# Patient Record
Sex: FEMALE | Race: BLACK OR AFRICAN AMERICAN | Employment: FULL TIME | URBAN - METROPOLITAN AREA
[De-identification: names, ages, dates, MRNs, and addresses within clinical notes are randomized per-mention and may not be internally consistent; named-entity substitution may affect disease eponyms.]

---

## 2021-06-11 ENCOUNTER — OFFICE VISIT (OUTPATIENT)
Dept: FAMILY MEDICINE CLINIC | Age: 51
End: 2021-06-11
Payer: COMMERCIAL

## 2021-06-11 ENCOUNTER — HOSPITAL ENCOUNTER (OUTPATIENT)
Dept: GENERAL RADIOLOGY | Age: 51
Discharge: HOME | End: 2021-06-11
Payer: COMMERCIAL

## 2021-06-11 VITALS
SYSTOLIC BLOOD PRESSURE: 136 MMHG | HEIGHT: 63 IN | RESPIRATION RATE: 20 BRPM | DIASTOLIC BLOOD PRESSURE: 69 MMHG | OXYGEN SATURATION: 98 % | HEART RATE: 87 BPM | TEMPERATURE: 97 F | BODY MASS INDEX: 35.08 KG/M2 | WEIGHT: 198 LBS

## 2021-06-11 DIAGNOSIS — Z13.0 SCREENING FOR DEFICIENCY ANEMIA: ICD-10-CM

## 2021-06-11 DIAGNOSIS — Z13.29 SCREENING FOR THYROID DISORDER: ICD-10-CM

## 2021-06-11 DIAGNOSIS — R35.0 FREQUENCY OF URINATION: ICD-10-CM

## 2021-06-11 DIAGNOSIS — Z00.00 ENCOUNTER FOR MEDICAL EXAMINATION TO ESTABLISH CARE: Primary | ICD-10-CM

## 2021-06-11 DIAGNOSIS — Z11.1 SCREENING-PULMONARY TB: ICD-10-CM

## 2021-06-11 DIAGNOSIS — E11.9 CONTROLLED TYPE 2 DIABETES MELLITUS WITHOUT COMPLICATION, WITHOUT LONG-TERM CURRENT USE OF INSULIN (HCC): ICD-10-CM

## 2021-06-11 DIAGNOSIS — Z11.59 NEED FOR HEPATITIS C SCREENING TEST: ICD-10-CM

## 2021-06-11 DIAGNOSIS — E78.00 HYPERCHOLESTEROLEMIA: ICD-10-CM

## 2021-06-11 DIAGNOSIS — E56.9 VITAMIN DEFICIENCY: ICD-10-CM

## 2021-06-11 PROBLEM — I10 HYPERTENSION: Status: ACTIVE | Noted: 2021-06-11

## 2021-06-11 PROCEDURE — 71046 X-RAY EXAM CHEST 2 VIEWS: CPT

## 2021-06-11 PROCEDURE — 99386 PREV VISIT NEW AGE 40-64: CPT | Performed by: INTERNAL MEDICINE

## 2021-06-11 RX ORDER — GABAPENTIN 100 MG/1
100 CAPSULE ORAL
COMMUNITY
Start: 2020-07-26 | End: 2021-06-11 | Stop reason: ALTCHOICE

## 2021-06-11 RX ORDER — EZETIMIBE 10 MG/1
10 TABLET ORAL DAILY
COMMUNITY
End: 2021-07-01 | Stop reason: ALTCHOICE

## 2021-06-11 RX ORDER — METFORMIN HYDROCHLORIDE 1000 MG/1
1000 TABLET ORAL 2 TIMES DAILY WITH MEALS
Qty: 60 TABLET | Refills: 3 | Status: SHIPPED | OUTPATIENT
Start: 2021-06-11 | End: 2021-07-01 | Stop reason: ALTCHOICE

## 2021-06-11 RX ORDER — ATORVASTATIN CALCIUM 80 MG/1
80 TABLET, FILM COATED ORAL DAILY
COMMUNITY
End: 2021-07-01 | Stop reason: ALTCHOICE

## 2021-06-11 RX ORDER — METFORMIN HYDROCHLORIDE 1000 MG/1
1000 TABLET ORAL 2 TIMES DAILY WITH MEALS
COMMUNITY
End: 2021-06-11 | Stop reason: SDUPTHER

## 2021-06-11 NOTE — PROGRESS NOTES
Chief Complaint   Patient presents with    New Patient     Patient want to discuss a different diabetic medication.

## 2021-06-11 NOTE — LETTER
6/11/2021    Ms. Brittany Lin  [address] [facility] [id number]      Dear Brittany Gonzalezving:    Please find your most recent results below. Chest x-ray is negative infection   Resulted Orders   XR CHEST PA LAT    Narrative    Exam:  2 view chest    Indication: Screening for pulmonary TB.    COMPARISON: 11/14/2013    PA and lateral views demonstrate normal heart size. There is no acute process in  the lung fields. No adenopathy. No pleural effusions. The osseous structures are  unremarkable. Impression    1. Normal chest radiograph       RECOMMENDATIONS:  None. Keep up the good work!     Please call me if you have any questions: [phone]    Sincerely,      Salima Teresa MD

## 2021-06-11 NOTE — PATIENT INSTRUCTIONS

## 2021-06-11 NOTE — PROGRESS NOTES
Chief Complaint   Patient presents with    New Patient     HPI:  Gabriela Mckeon is a 54 y.o. AA female with h/o hypertension, hypercholesterolemia, diabetes type 2 presents to establish care for physical exam  Patient is doing well. Blood pressure is at goal. She is here for clearance for foster parenting.  She has no complaint     Review of Systems  Constitutional: negative for fevers/chills  Eyes:   negative for visual disturbance   Respiratory:  negative for cough, dyspnea,wheezing  CV:   negative for chest pain, palpitations, lower extremity edema  GI:   negative for nausea, abdominal pain  Endo:             negative for polyuria,polydipsia,polyphagia,heat intolerance  Genitourinary: negative for frequency, dysuria   Integument:  negative for rash and pruritus  Musculoskel: negative for myalgias, arthralgias, back pain  Neurological:  negative for headaches, dizziness  Behavl/Psych: negative for feelings of anxiety, depression, mood changes    Past Medical History:   Diagnosis Date    Diabetes ([facility] [address]     Hypercholesterolemia     Hypertension      Past Surgical History:   Procedure Laterality Date    HX GYN      D&C    HX OTHER SURGICAL      tonsils     Social History     Socioeconomic History    Marital status:      Spouse name: Not on file    Number of children: Not on file    Years of education: Not on file    Highest education level: Not on file   Tobacco Use    Smoking status: Never Smoker    Smokeless tobacco: Never Used   Substance and Sexual Activity    Alcohol use: Yes     Comment: social    Drug use: Not Currently    Sexual activity: Yes     Partners: Male     Birth control/protection: Surgical     Social Determinants of Health     Financial Resource Strain:     Difficulty of Paying Living Expenses:    Food Insecurity:     Worried About Running Out of Food in the Last Year:     Ran Out of Food in the Last Year:    Transportation Needs:     Lack of Transportation (Medical):  Lack of Transportation (Non-Medical):    Physical Activity:     Days of Exercise per Week:     Minutes of Exercise per Session:    Stress:     Feeling of Stress :    Social Connections:     Frequency of Communication with Friends and Family:     Frequency of Social Gatherings with Friends and Family:     Attends Yazdanism Services:     Active Member of Clubs or Organizations:     Attends Club or Organization Meetings:     Marital Status:      Family History   Problem Relation Age of Onset    Diabetes Mother     Cancer Mother     Cancer Father      Current Outpatient Medications   Medication Sig Dispense Refill    semaglutide (Rybelsus) 14 mg tablet Take 14 mg by mouth Daily (before breakfast).  metFORMIN (GLUCOPHAGE) 1,000 mg tablet Take 1,000 mg by mouth two (2) times daily (with meals).  atorvastatin (LIPITOR) 80 mg tablet Take 80 mg by mouth daily.  ezetimibe (ZETIA) 10 mg tablet Take 10 mg by mouth daily.        Allergies   Allergen Reactions    Flagyl [Metronidazole] Hives    Penicillin G Hives     Objective:  Visit Vitals  /69   Pulse 87   Temp 97 °F (36.1 °C) (Oral)   Resp 20   Ht 5' 3\" (1.6 m)   Wt 198 lb (89.8 kg)   SpO2 98%   BMI 35.07 kg/m²     Physical Exam:   General appearance - alert, well appearing in no distress  Mental status - alert, oriented to person, place, and time  EYE-PERRL, EOMI  ENT-ENT exam normal, palpable neck nodes, maxillary sinus tenderness  Neck - supple, no significant adenopathy   Chest - clear to auscultation, no wheezes, rales or rhonchi  Heart - normal rate, regular rhythm, no murmurs  Abdomen - soft, nontender, nondistended, no masses or organomegaly  Lymph- no adenopathy palpable  Ext-peripheral pulses normal, no pedal edema  Neuro -no focal findings   Back-full range of motion, no tenderness, palpable spasm or pain on motion     Assessment/Plan:  Diagnoses and all orders for this visit:    Encounter for medical examination to establish care  -     METABOLIC PANEL, COMPREHENSIVE; Future  -     CBC W/O DIFF; Future  -     METABOLIC PANEL, COMPREHENSIVE  -     CBC W/O DIFF    Screening-pulmonary TB  -     XR CHEST PA LAT; Future    Need for hepatitis C screening test  -     HEPATITIS C AB; Future  -     HEPATITIS C AB    Hypercholesterolemia  -     LIPID PANEL; Future  -     LIPID PANEL    Controlled type 2 diabetes mellitus without complication, without long-term current use of insulin (HCC)  -     METABOLIC PANEL, COMPREHENSIVE; Future  -     CBC W/O DIFF; Future  -     HEMOGLOBIN A1C WITH EAG; Future  -     MICROALBUMIN, UR, RAND W/ MICROALB/CREAT RATIO; Future  -     semaglutide (Rybelsus) 14 mg tablet; Take 1 Tablet by mouth Daily (before breakfast). Take 14 mg by mouth Daily (before breakfast). , Normal, Disp-30 Tablet, R-3  -     metFORMIN (GLUCOPHAGE) 1,000 mg tablet; Take 1 Tablet by mouth two (2) times daily (with meals). , Normal, Disp-60 Tablet, R-3  -     METABOLIC PANEL, COMPREHENSIVE  -     CBC W/O DIFF  -     HEMOGLOBIN A1C WITH EAG  -     MICROALBUMIN, UR, RAND W/ MICROALB/CREAT RATIO    Vitamin deficiency  -     VITAMIN D, 25 HYDROXY; Future  -     VITAMIN D, 25 HYDROXY    Frequency of urination  -     URINALYSIS W/ RFLX MICROSCOPIC; Future  -     URINALYSIS W/ RFLX MICROSCOPIC    Screening for thyroid disorder  -     TSH 3RD GENERATION; Future  -     TSH 3RD GENERATION    Screening for deficiency anemia  -     CBC W/O DIFF; Future  -     CBC W/O DIFF      Patient Instructions       Nutrition Tips for Diabetes: After Your Visit  Your Care Instructions  A healthy diet is important to manage diabetes. It helps you lose weight (if you need to) and keep it off. It gives you the nutrition and energy your body needs and helps prevent heart disease. But a diet for diabetes does not mean that you have to eat special foods. You can eat what your family eats, including occasional sweets and other favorites.  But you do have to pay attention to how often you eat and how much you eat of certain foods. The right plan for you will give you meals that help you keep your blood sugar at healthy levels. Try to eat a variety of foods and to spread carbohydrate throughout the day. Carbohydrate raises blood sugar higher and more quickly than any other nutrient does. Carbohydrate is found in sugar, breads and cereals, fruit, starchy vegetables such as potatoes and corn, and milk and yogurt. You may want to work with a dietitian or diabetes educator to help you plan meals and snacks. A dietitian or diabetes educator also can help you lose weight if that is one of your goals. The following tips can help you enjoy your meals and stay healthy. Follow-up care is a key part of your treatment and safety. Be sure to make and go to all appointments, and call your doctor if you are having problems. Its also a good idea to know your test results and keep a list of the medicines you take. How can you care for yourself at home? · Learn which foods have carbohydrate and how much carbohydrate to eat. A dietitian or diabetes educator can help you learn to keep track of how much carbohydrate you eat. · Spread carbohydrate throughout the day. Eat some carbohydrate at all meals, but do not eat too much at any one time. · Plan meals to include food from all the food groups. These are the food groups and some example portion sizes:  ¨ Grains: 1 slice of bread (1 ounce), ½ cup of cooked cereal, and 1/3 cup of cooked pasta or rice. These have about 15 grams of carbohydrate in a serving. Choose whole grains such as whole wheat bread or crackers, oatmeal, and brown rice more often than refined grains. ¨ Fruit: 1 small fresh fruit, such as an apple or orange; ½ of a banana; ½ cup of chopped, cooked, or canned fruit; ½ cup of fruit juice; 1 cup of melon or raspberries; and 2 tablespoons of dried fruit. These have about 15 grams of carbohydrate in a serving.   ¨ Dairy: 1 cup of nonfat or low-fat milk and 2/3 cup of plain yogurt. These have about 15 grams of carbohydrate in a serving. ¨ Protein foods: Beef, chicken, turkey, fish, eggs, tofu, cheese, cottage cheese, and peanut butter. A serving size of meat is 3 ounces, which is about the size of a deck of cards. Examples of meat substitute serving sizes (equal to 1 ounce of meat) are 1/4 cup of cottage cheese, 1 egg, 1 tablespoon of peanut butter, and ½ cup of tofu. These have very little or no carbohydrate per serving. ¨ Vegetables: Starchy vegetables such as ½ cup of cooked dried beans, peas, potatoes, or corn have about 15 grams of carbohydrate. Nonstarchy vegetables have very little carbohydrate, such as 1 cup of raw leafy vegetables (such as spinach), ½ cup of other vegetables (cooked or chopped), and 3/4 cup of vegetable juice. · Use the plate format to plan meals. It is a good, quick way to make sure that you have a balanced meal. It also helps you spread carbohydrate throughout the day. You divide your plate by types of foods. Put vegetables on half the plate, meat or meat substitutes on one-quarter of the plate, and a grain or starchy vegetable (such as brown rice or a potato) in the final quarter of the plate. To this you can add a small piece of fruit and 1 cup of milk or yogurt, depending on how much carbohydrate you are supposed to eat at a meal.  · Talk to your dietitian or diabetes educator about ways to add limited amounts of sweets into your meal plan. You can eat these foods now and then, as long as you include the amount of carbohydrate they have in your daily carbohydrate allowance. · If you drink alcohol, limit it to no more than 1 drink a day for women and 2 drinks a day for men. If you are pregnant, no amount of alcohol is known to be safe. · Protein, fat, and fiber do not raise blood sugar as much as carbohydrate does.  If you eat a lot of these nutrients in a meal, your blood sugar will rise more slowly than it would otherwise. · Limit saturated fats, such as those from meat and dairy products. Try to replace it with monounsaturated fat, such as olive oil. This is a healthier choice because people who have diabetes are at higher-than-average risk of heart disease. But use a modest amount of olive oil. A tablespoon of olive oil has 14 grams of fat and 120 calories. · Exercise lowers blood sugar. If you take insulin by shots or pump, you can use less than you would if you were not exercising. Keep in mind that timing matters. If you exercise within 1 hour after a meal, your body may need less insulin for that meal than it would if you exercised 3 hours after the meal. Test your blood sugar to find out how exercise affects your need for insulin. · Exercise on most days of the week. Aim for at least 30 minutes. Exercise helps you stay at a healthy weight and helps your body use insulin. Walking is an easy way to get exercise. Gradually increase the amount you walk every day. You also may want to swim, bike, or do other activities. When you eat out  · Learn to estimate the serving sizes of foods that have carbohydrate. If you measure food at home, it will be easier to estimate the amount in a serving of restaurant food. · If the meal you order has too much carbohydrate (such as potatoes, corn, or baked beans), ask to have a low-carbohydrate food instead. Ask for a salad or green vegetables. · If you use insulin, check your blood sugar before and after eating out to help you plan how much to eat in the future. · If you eat more carbohydrate at a meal than you had planned, take a walk or do other exercise. This will help lower your blood sugar. Where can you learn more? Go to [employer]  Enter B428 in the search box to learn more about \"Nutrition Tips for Diabetes: After Your Visit. \"   © 7523-7602 [employer].  Care instructions adapted under license by [employer] (which disclaims liability or warranty for this information). This care instruction is for use with your licensed healthcare professional. If you have questions about a medical condition or this instruction, always ask your healthcare professional. [address] any warranty or liability for your use of this information. Content Version: 51.6.334959; Current as of: June 4, 2014                Follow-up and Dispositions    · Return 2-3 weeks, for f/u results.

## 2021-06-12 LAB
25(OH)D3+25(OH)D2 SERPL-MCNC: 16.2 NG/ML (ref 30–100)
ALBUMIN SERPL-MCNC: 4.5 G/DL (ref 3.8–4.8)
ALBUMIN/CREAT UR: <2 MG/G CREAT (ref 0–29)
ALBUMIN/GLOB SERPL: 1.8 {RATIO} (ref 1.2–2.2)
ALP SERPL-CCNC: 75 IU/L (ref 48–121)
ALT SERPL-CCNC: 9 IU/L (ref 0–32)
APPEARANCE UR: ABNORMAL
AST SERPL-CCNC: 14 IU/L (ref 0–40)
BILIRUB SERPL-MCNC: 0.3 MG/DL (ref 0–1.2)
BILIRUB UR QL STRIP: NEGATIVE
BUN SERPL-MCNC: 11 MG/DL (ref 6–24)
BUN/CREAT SERPL: 14 (ref 9–23)
CALCIUM SERPL-MCNC: 9.3 MG/DL (ref 8.7–10.2)
CHLORIDE SERPL-SCNC: 102 MMOL/L (ref 96–106)
CHOLEST SERPL-MCNC: 182 MG/DL (ref 100–199)
CO2 SERPL-SCNC: 22 MMOL/L (ref 20–29)
COLOR UR: YELLOW
CREAT SERPL-MCNC: 0.76 MG/DL (ref 0.57–1)
CREAT UR-MCNC: 145 MG/DL
ERYTHROCYTE [DISTWIDTH] IN BLOOD BY AUTOMATED COUNT: 12.8 % (ref 11.7–15.4)
EST. AVERAGE GLUCOSE BLD GHB EST-MCNC: 151 MG/DL
GLOBULIN SER CALC-MCNC: 2.5 G/DL (ref 1.5–4.5)
GLUCOSE SERPL-MCNC: 116 MG/DL (ref 65–99)
GLUCOSE UR QL: NEGATIVE
HBA1C MFR BLD: 6.9 % (ref 4.8–5.6)
HCT VFR BLD AUTO: 34.2 % (ref 34–46.6)
HCV AB S/CO SERPL IA: <0.1 S/CO RATIO (ref 0–0.9)
HDLC SERPL-MCNC: 96 MG/DL
HGB BLD-MCNC: 11.1 G/DL (ref 11.1–15.9)
HGB UR QL STRIP: NEGATIVE
KETONES UR QL STRIP: NEGATIVE
LDLC SERPL CALC-MCNC: 72 MG/DL (ref 0–99)
LEUKOCYTE ESTERASE UR QL STRIP: NEGATIVE
MCH RBC QN AUTO: 27.8 PG (ref 26.6–33)
MCHC RBC AUTO-ENTMCNC: 32.5 G/DL (ref 31.5–35.7)
MCV RBC AUTO: 86 FL (ref 79–97)
MICRO URNS: ABNORMAL
MICROALBUMIN UR-MCNC: <3 UG/ML
NITRITE UR QL STRIP: NEGATIVE
PH UR STRIP: 7.5 [PH] (ref 5–7.5)
PLATELET # BLD AUTO: 253 X10E3/UL (ref 150–450)
POTASSIUM SERPL-SCNC: 4 MMOL/L (ref 3.5–5.2)
PROT SERPL-MCNC: 7 G/DL (ref 6–8.5)
PROT UR QL STRIP: NEGATIVE
RBC # BLD AUTO: 3.99 X10E6/UL (ref 3.77–5.28)
SODIUM SERPL-SCNC: 138 MMOL/L (ref 134–144)
SP GR UR: 1.02 (ref 1–1.03)
TRIGL SERPL-MCNC: 74 MG/DL (ref 0–149)
TSH SERPL DL<=0.005 MIU/L-ACNC: 1.05 UIU/ML (ref 0.45–4.5)
UROBILINOGEN UR STRIP-MCNC: 0.2 MG/DL (ref 0.2–1)
VLDLC SERPL CALC-MCNC: 14 MG/DL (ref 5–40)
WBC # BLD AUTO: 5.8 X10E3/UL (ref 3.4–10.8)

## 2021-07-01 ENCOUNTER — OFFICE VISIT (OUTPATIENT)
Dept: FAMILY MEDICINE CLINIC | Age: 51
End: 2021-07-01
Payer: COMMERCIAL

## 2021-07-01 VITALS
WEIGHT: 203.2 LBS | HEART RATE: 78 BPM | SYSTOLIC BLOOD PRESSURE: 137 MMHG | BODY MASS INDEX: 36 KG/M2 | DIASTOLIC BLOOD PRESSURE: 79 MMHG | TEMPERATURE: 97.3 F | OXYGEN SATURATION: 93 % | RESPIRATION RATE: 16 BRPM | HEIGHT: 63 IN

## 2021-07-01 DIAGNOSIS — E56.9 VITAMIN DEFICIENCY: ICD-10-CM

## 2021-07-01 DIAGNOSIS — E78.00 HYPERCHOLESTEROLEMIA: ICD-10-CM

## 2021-07-01 DIAGNOSIS — E11.9 ENCOUNTER FOR DIABETIC FOOT EXAM (HCC): ICD-10-CM

## 2021-07-01 DIAGNOSIS — E11.9 DIABETIC EYE EXAM (HCC): ICD-10-CM

## 2021-07-01 DIAGNOSIS — E11.9 CONTROLLED TYPE 2 DIABETES MELLITUS WITHOUT COMPLICATION, WITHOUT LONG-TERM CURRENT USE OF INSULIN (HCC): Primary | ICD-10-CM

## 2021-07-01 DIAGNOSIS — Z01.419 ENCOUNTER FOR GYNECOLOGICAL EXAMINATION: ICD-10-CM

## 2021-07-01 DIAGNOSIS — Z01.00 DIABETIC EYE EXAM (HCC): ICD-10-CM

## 2021-07-01 PROCEDURE — 99214 OFFICE O/P EST MOD 30 MIN: CPT | Performed by: INTERNAL MEDICINE

## 2021-07-01 RX ORDER — ACETAMINOPHEN 500 MG
2000 TABLET ORAL DAILY
Qty: 30 CAPSULE | Refills: 3 | Status: SHIPPED | OUTPATIENT
Start: 2021-07-01 | End: 2021-09-24 | Stop reason: ALTCHOICE

## 2021-07-01 RX ORDER — LISINOPRIL 2.5 MG/1
2.5 TABLET ORAL DAILY
Qty: 30 TABLET | Refills: 2 | Status: SHIPPED | OUTPATIENT
Start: 2021-07-01 | End: 2021-10-04

## 2021-07-01 RX ORDER — METFORMIN HYDROCHLORIDE 500 MG/1
1000 TABLET, EXTENDED RELEASE ORAL
Qty: 60 TABLET | Refills: 3 | Status: SHIPPED | OUTPATIENT
Start: 2021-07-01 | End: 2021-08-27 | Stop reason: SDUPTHER

## 2021-07-01 RX ORDER — ROSUVASTATIN CALCIUM 5 MG/1
5 TABLET, COATED ORAL
Qty: 30 TABLET | Refills: 3 | Status: SHIPPED | OUTPATIENT
Start: 2021-07-01 | End: 2021-10-04

## 2021-07-01 NOTE — PATIENT INSTRUCTIONS
Learning About Foods With Healthy Fats  What foods contain healthy fats? The foods you eat contain nutrients, such as vitamins and minerals. Fat is a nutrient. Your body needs the right amount to stay healthy and work as it should. You can use the list below to help you make choices about which foods to eat. Here are some foods that contain healthy fats. Unsaturated fats and oils  · Canola oil  · Corn oil  · Flaxseed oil  · Olive oil  · Peanut oil  · Safflower oil  · Sunflower oil  Seafood  · Herring  · Lake trout  · Mackerel  · Oysters  · [place]  · Sardines  Nuts and seeds  · Almonds  · Catalino seeds  · Flaxseeds (ground)  · Hazelnuts  · Nut butters (such as peanut and almond)  · Pumpkin seeds  · Sunflower seeds  · Walnuts  Fruits  · Avocados  · Olives  Work with your doctor to find out how much of this nutrient you need. Depending on your health, you may need more or less of it in your diet. Where can you learn more? Go to http://www.ace.com/  Enter F360 in the search box to learn more about \"Learning About Foods With Healthy Fats. \"  Current as of: December 17, 2020               Content Version: 12.8  © 2006-2021 Healthwise, Incorporated. Care instructions adapted under license by [employer] (which disclaims liability or warranty for this information). If you have questions about a medical condition or this instruction, always ask your healthcare professional. [place] [address] any warranty or liability for your use of this information.

## 2021-07-01 NOTE — PROGRESS NOTES
Chief Complaint   Patient presents with    Results     labs review      HPI:  Atilio Posey is a 54 y.o. AA female presents for 3 week follow up on lab results. Serum Vit D is below normal. Urine is cloudy without sign of infection. Otherwise, the rest of results are within normal limits. Results and management have discussed. Diet and exercise are encouraged. Current medication dosage have adjusted. Review of Systems  As per hpi    Past Medical History:   Diagnosis Date    Diabetes ([address]     Hypercholesterolemia     Hypertension      Past Surgical History:   Procedure Laterality Date    HX AFIB ABLATION  2012    HX GYN      D&C    HX ORTHOPAEDIC      bicipital tendon repair 2016    HX OTHER SURGICAL      tonsils    HX TONSILLECTOMY      age 27     Social History     Socioeconomic History    Marital status:      Spouse name: Not on file    Number of children: Not on file    Years of education: Not on file    Highest education level: Not on file   Tobacco Use    Smoking status: Never Smoker    Smokeless tobacco: Never Used   Vaping Use    Vaping Use: Never used   Substance and Sexual Activity    Alcohol use: Yes     Comment: social    Drug use: Not Currently    Sexual activity: Yes     Partners: Male     Birth control/protection: Surgical     Social Determinants of Health     Financial Resource Strain:     Difficulty of Paying Living Expenses:    Food Insecurity:     Worried About Running Out of Food in the Last Year:     Ran Out of Food in the Last Year:    Transportation Needs:     Lack of Transportation (Medical):      Lack of Transportation (Non-Medical):    Physical Activity:     Days of Exercise per Week:     Minutes of Exercise per Session:    Stress:     Feeling of Stress :    Social Connections:     Frequency of Communication with Friends and Family:     Frequency of Social Gatherings with Friends and Family:     Attends Gnosticist Services:     Active Member of Clubs or Organizations:     Attends Club or Organization Meetings:     Marital Status:      Family History   Problem Relation Age of Onset    Diabetes Mother     Cancer Mother     Cancer Father      Current Outpatient Medications   Medication Sig Dispense Refill    semaglutide (Rybelsus) 14 mg tablet Take 1 Tablet by mouth Daily (before breakfast). Take 14 mg by mouth Daily (before breakfast). 30 Tablet 3     Allergies   Allergen Reactions    Flagyl [Metronidazole] Hives    Penicillin G Hives    Tuberculin Ppd Other (comments)     Contraindication  ( positive test )     Objective:  Visit Vitals  /79   Pulse 78   Temp 97.3 °F (36.3 °C) (Oral)   Resp 16   Ht 5' 3\" (1.6 m)   Wt 203 lb 3.2 oz (92.2 kg)   LMP 06/29/2021 (Exact Date)   SpO2 93%   BMI 36.00 kg/m²     Physical Exam:   General appearance - alert, well appearing in no distress  Mental status - alert, oriented to person, place, and time  Neck - supple, no significant adenopathy   Chest - clear to auscultation, no wheezes, rales or rhonchi  Heart - normal rate, regular rhythm, no murmurs  Abdomen - soft, nontender, nondistended, no organomegaly  Ext-peripheral pulses normal, no pedal edema    Results for orders placed or performed in visit on 06/11/21   HEPATITIS C AB   Result Value Ref Range    Hep C Virus Ab <0.1 0.0 - 0.9 s/co ratio   METABOLIC PANEL, COMPREHENSIVE   Result Value Ref Range    Glucose 116 (H) 65 - 99 mg/dL    BUN 11 6 - 24 mg/dL    Creatinine 0.76 0.57 - 1.00 mg/dL    GFR est non-AA 92 >59 mL/min/1.73    GFR est  >59 mL/min/1.73    BUN/Creatinine ratio 14 9 - 23    Sodium 138 134 - 144 mmol/L    Potassium 4.0 3.5 - 5.2 mmol/L    Chloride 102 96 - 106 mmol/L    CO2 22 20 - 29 mmol/L    Calcium 9.3 8.7 - 10.2 mg/dL    Protein, total 7.0 6.0 - 8.5 g/dL    Albumin 4.5 3.8 - 4.8 g/dL    GLOBULIN, TOTAL 2.5 1.5 - 4.5 g/dL    A-G Ratio 1.8 1.2 - 2.2    Bilirubin, total 0.3 0.0 - 1.2 mg/dL    Alk.  phosphatase 75 48 - 121 IU/L AST (SGOT) 14 0 - 40 IU/L    ALT (SGPT) 9 0 - 32 IU/L   CBC W/O DIFF   Result Value Ref Range    WBC 5.8 3.4 - 10.8 x10E3/uL    RBC 3.99 3.77 - 5.28 x10E6/uL    HGB 11.1 11.1 - 15.9 g/dL    HCT 34.2 34.0 - 46.6 %    MCV 86 79 - 97 fL    MCH 27.8 26.6 - 33.0 pg    MCHC 32.5 31.5 - 35.7 g/dL    RDW 12.8 11.7 - 15.4 %    PLATELET 497 957 - 833 x10E3/uL   LIPID PANEL   Result Value Ref Range    Cholesterol, total 182 100 - 199 mg/dL    Triglyceride 74 0 - 149 mg/dL    HDL Cholesterol 96 >39 mg/dL    VLDL, calculated 14 5 - 40 mg/dL    LDL, calculated 72 0 - 99 mg/dL   HEMOGLOBIN A1C WITH EAG   Result Value Ref Range    Hemoglobin A1c 6.9 (H) 4.8 - 5.6 %    Estimated average glucose 151 mg/dL   VITAMIN D, 25 HYDROXY   Result Value Ref Range    VITAMIN D, 25-HYDROXY 16.2 (L) 30.0 - 100.0 ng/mL   URINALYSIS W/ RFLX MICROSCOPIC   Result Value Ref Range    Specific Gravity 1.024 1.005 - 1.030    pH (UA) 7.5 5.0 - 7.5    Color Yellow Yellow    Appearance Cloudy (A) Clear    Leukocyte Esterase Negative Negative    Protein Negative Negative/Trace    Glucose Negative Negative    Ketone Negative Negative    Blood Negative Negative    Bilirubin Negative Negative    Urobilinogen 0.2 0.2 - 1.0 mg/dL    Nitrites Negative Negative    Microscopic Examination Comment    MICROALBUMIN, UR, RAND W/ MICROALB/CREAT RATIO   Result Value Ref Range    Creatinine, urine 145.0 Not Estab. mg/dL    Microalbumin, urine <3.0 Not Estab. ug/mL    Microalb/Creat ratio (ug/mg creat.) <2 0 - 29 mg/g creat   TSH 3RD GENERATION   Result Value Ref Range    TSH 1.050 0.450 - 4.500 uIU/mL     Assessment/Plan:  Diagnoses and all orders for this visit:    Controlled type 2 diabetes mellitus without complication, without long-term current use of insulin (HCC)  -     rosuvastatin (CRESTOR) 5 mg tablet; Take 1 Tablet by mouth nightly., Normal, Disp-30 Tablet, R-3  -     metFORMIN ER (GLUCOPHAGE XR) 500 mg tablet; Take 2 Tablets by mouth daily (with dinner). , Normal, Disp-60 Tablet, R-3  -     lisinopriL (PRINIVIL, ZESTRIL) 2.5 mg tablet; Take 1 Tablet by mouth daily. , Normal, Disp-30 Tablet, R-2    Hypercholesterolemia  -     rosuvastatin (CRESTOR) 5 mg tablet; Take 1 Tablet by mouth nightly., Normal, Disp-30 Tablet, R-3    Vitamin deficiency  -     cholecalciferol (VITAMIN D3) (2,000 UNITS /50 MCG) cap capsule; Take 1 Capsule by mouth daily. , Normal, Disp-30 Capsule, R-3    Diabetic eye exam ([facility] [address]  -     REFERRAL TO OPHTHALMOLOGY    Encounter for diabetic foot exam ([facility] [address]  -     REFERRAL TO PODIATRY    Encounter for gynecological examination      Patient Instructions        Learning About Foods With Healthy Fats  What foods contain healthy fats? The foods you eat contain nutrients, such as vitamins and minerals. Fat is a nutrient. Your body needs the right amount to stay healthy and work as it should. You can use the list below to help you make choices about which foods to eat. Here are some foods that contain healthy fats. Unsaturated fats and oils  · Canola oil  · Corn oil  · Flaxseed oil  · Olive oil  · Peanut oil  · Safflower oil  · Sunflower oil  Seafood  · [place]ring  · [place]  · Mackerel  · Oysters  · [place]  · Sardines  Nuts and seeds  · Almonds  · Catalino seeds  · Flaxseeds (ground)  · Hazelnuts  · Nut butters (such as peanut and almond)  · Pumpkin seeds  · Sunflower seeds  · Walnuts  Fruits  · Avocados  · Olives  Work with your doctor to find out how much of this nutrient you need. Depending on your health, you may need more or less of it in your diet. Where can you learn more? Go to http://www.[employer].com/  Enter F360 in the search box to learn more about \"Learning About Foods With Healthy Fats. \"  Current as of: December 17, 2020               Content Version: 12.8  © 5580-2879 Healthwise, Incorporated. Care instructions adapted under license by [employer] (which disclaims liability or warranty for this information). If you have questions about a medical condition or this instruction, always ask your healthcare professional. [place] [address] any warranty or liability for your use of this information. Follow-up and Dispositions    · Return in about 4 months (around 11/1/2021), or if symptoms worsen or fail to improve, for routine follow up.

## 2021-07-01 NOTE — LETTER
6/25/2021    Ms. Atilio Posey  [address] [place] [id number]      Dear Atilio Posey:    Please find your most recent results below. Resulted Orders   HEPATITIS C AB   Result Value Ref Range    Hep C Virus Ab <0.1 0.0 - 0.9 s/co ratio    Narrative    Performed at:  [address]  [id number]  : Nallely Quintero MD, Phone:  [phone]   METABOLIC PANEL, COMPREHENSIVE   Result Value Ref Range    Glucose 116 (H) 65 - 99 mg/dL    BUN 11 6 - 24 mg/dL    Creatinine 0.76 0.57 - 1.00 mg/dL    GFR est non-AA 92 >59 mL/min/1.73    GFR est  >59 mL/min/1.73    BUN/Creatinine ratio 14 9 - 23    Sodium 138 134 - 144 mmol/L    Potassium 4.0 3.5 - 5.2 mmol/L    Chloride 102 96 - 106 mmol/L    CO2 22 20 - 29 mmol/L    Calcium 9.3 8.7 - 10.2 mg/dL    Protein, total 7.0 6.0 - 8.5 g/dL    Albumin 4.5 3.8 - 4.8 g/dL    GLOBULIN, TOTAL 2.5 1.5 - 4.5 g/dL    A-G Ratio 1.8 1.2 - 2.2    Bilirubin, total 0.3 0.0 - 1.2 mg/dL    Alk.  phosphatase 75 48 - 121 IU/L    AST (SGOT) 14 0 - 40 IU/L    ALT (SGPT) 9 0 - 32 IU/L    Narrative    Performed at:  [address]  [id number]  : Nallely Quintero MD, Phone:  [phone]   CBC W/O DIFF   Result Value Ref Range    WBC 5.8 3.4 - 10.8 x10E3/uL    RBC 3.99 3.77 - 5.28 x10E6/uL    HGB 11.1 11.1 - 15.9 g/dL    HCT 34.2 34.0 - 46.6 %    MCV 86 79 - 97 fL    MCH 27.8 26.6 - 33.0 pg    MCHC 32.5 31.5 - 35.7 g/dL    RDW 12.8 11.7 - 15.4 %    PLATELET 376 819 - 265 x10E3/uL    Narrative    Performed at:  [address]  [id number]  : Nallely Quintero MD, Phone:  [phone]   LIPID PANEL   Result Value Ref Range    Cholesterol, total 182 100 - 199 mg/dL    Triglyceride 74 0 - 149 mg/dL    HDL Cholesterol 96 >39 mg/dL    VLDL, calculated 14 5 - 40 mg/dL    LDL, calculated 72 0 - 99 mg/dL    Narrative    Performed at:  [address]  [id number]  : Radha Brown MD, Phone:  [phone]   HEMOGLOBIN A1C WITH EAG   Result Value Ref Range    Hemoglobin A1c 6.9 (H) 4.8 - 5.6 %    Estimated average glucose 151 mg/dL    Narrative    Performed at:  [address]  [id number]  : Radha Brown MD, Phone:  [phone]   VITAMIN D, 25 HYDROXY   Result Value Ref Range    VITAMIN D, 25-HYDROXY 16.2 (L) 30.0 - 100.0 ng/mL    Narrative    Performed at:  [address]  [id number]  : Radha Brown MD, Phone:  [phone]   URINALYSIS W/ RFLX MICROSCOPIC   Result Value Ref Range    Specific Gravity 1.024 1.005 - 1.030    pH (UA) 7.5 5.0 - 7.5    Color Yellow Yellow    Appearance Cloudy (A) Clear    Leukocyte Esterase Negative Negative    Protein Negative Negative/Trace    Glucose Negative Negative    Ketone Negative Negative    Blood Negative Negative    Bilirubin Negative Negative    Urobilinogen 0.2 0.2 - 1.0 mg/dL    Nitrites Negative Negative    Microscopic Examination Comment     Narrative    Performed at:  [address]  [id number]  : Radha Brown MD, Phone:  [phone]   MICROALBUMIN, UR, RAND W/ MICROALB/CREAT RATIO   Result Value Ref Range    Creatinine, urine 145.0 Not Estab. mg/dL    Microalbumin, urine <3.0 Not Estab. ug/mL    Microalb/Creat ratio (ug/mg creat.) <2 0 - 29 mg/g creat    Narrative    Performed at:  [address]  [id number]  : Radha Brown MD, Phone:  [phone]   TSH 3RD GENERATION   Result Value Ref Range    TSH 1.050 0.450 - 4.500 uIU/mL    Narrative    Performed at:  [address]  [id number]  : Radha Brown MD, Phone:  [phone]       RECOMMENDATIONS:  Vit D is low, urine is cloudy  None.  Keep up the good work!   Prescription for vit D is in pharmacy    Please call me if you have any questions: [phone]    Sincerely,      Joe Murray MD

## 2021-07-01 NOTE — PROGRESS NOTES
Chief Complaint   Patient presents with    Results     labs review        1. Have you been to the ER, urgent care clinic since your last visit? Hospitalized since your last visit? No    2. Have you seen or consulted any other health care providers outside of the [address] [place] [address] since your last visit? Include any pap smears or colon screening.  No

## 2021-08-26 ENCOUNTER — OFFICE VISIT (OUTPATIENT)
Dept: FAMILY MEDICINE CLINIC | Age: 51
End: 2021-08-26
Payer: COMMERCIAL

## 2021-08-26 VITALS
BODY MASS INDEX: 36.32 KG/M2 | SYSTOLIC BLOOD PRESSURE: 119 MMHG | DIASTOLIC BLOOD PRESSURE: 69 MMHG | HEART RATE: 98 BPM | OXYGEN SATURATION: 98 % | TEMPERATURE: 97.8 F | WEIGHT: 205 LBS | HEIGHT: 63 IN | RESPIRATION RATE: 20 BRPM

## 2021-08-26 DIAGNOSIS — Z01.419 ENCOUNTER FOR GYNECOLOGICAL EXAMINATION: ICD-10-CM

## 2021-08-26 DIAGNOSIS — Z12.11 ENCOUNTER FOR COLORECTAL CANCER SCREENING: ICD-10-CM

## 2021-08-26 DIAGNOSIS — Z12.12 ENCOUNTER FOR COLORECTAL CANCER SCREENING: ICD-10-CM

## 2021-08-26 DIAGNOSIS — M54.40 ACUTE RIGHT-SIDED LOW BACK PAIN WITH SCIATICA, SCIATICA LATERALITY UNSPECIFIED: Primary | ICD-10-CM

## 2021-08-26 PROBLEM — Z98.890 H/O COLONOSCOPY: Status: ACTIVE | Noted: 2021-08-26

## 2021-08-26 PROBLEM — Z98.890 STATUS POST ENDOMETRIAL ABLATION: Status: ACTIVE | Noted: 2021-08-26

## 2021-08-26 LAB
BILIRUB UR QL STRIP: NEGATIVE
GLUCOSE UR-MCNC: NEGATIVE MG/DL
KETONES P FAST UR STRIP-MCNC: NEGATIVE MG/DL
PH UR STRIP: 6 [PH] (ref 4.6–8)
PROT UR QL STRIP: NEGATIVE
SP GR UR STRIP: 1.03 (ref 1–1.03)
UA UROBILINOGEN AMB POC: NORMAL (ref 0.2–1)
URINALYSIS CLARITY POC: CLEAR
URINALYSIS COLOR POC: YELLOW
URINE BLOOD POC: NEGATIVE
URINE LEUKOCYTES POC: NEGATIVE
URINE NITRITES POC: NEGATIVE

## 2021-08-26 PROCEDURE — 81001 URINALYSIS AUTO W/SCOPE: CPT | Performed by: INTERNAL MEDICINE

## 2021-08-26 PROCEDURE — 99214 OFFICE O/P EST MOD 30 MIN: CPT | Performed by: INTERNAL MEDICINE

## 2021-08-26 RX ORDER — GABAPENTIN 100 MG/1
CAPSULE ORAL
COMMUNITY

## 2021-08-26 RX ORDER — METHOCARBAMOL 750 MG/1
750 TABLET, FILM COATED ORAL 2 TIMES DAILY
Qty: 20 TABLET | Refills: 1 | Status: SHIPPED | OUTPATIENT
Start: 2021-08-26

## 2021-08-26 RX ORDER — MELOXICAM 15 MG/1
15 TABLET ORAL DAILY
Qty: 30 TABLET | Refills: 2 | Status: SHIPPED | OUTPATIENT
Start: 2021-08-26 | End: 2021-09-24 | Stop reason: ALTCHOICE

## 2021-08-26 NOTE — PROGRESS NOTES
Chief Complaint   Patient presents with    LOW BACK PAIN     x 4 days      HPI:  Rafaela Pete is a 54 y.o. female presents with c/o low back pain radiating across the back. Symptoms has been ongoing for 4 days. Denies knowledge of recent injury. Pain rated 7/10. Pain is not reproduced on exam.  She has not tried any treatment. Review of Systems  As per hpi    Past Medical History:   Diagnosis Date    Diabetes ([address]     Hypercholesterolemia     Hypertension      Past Surgical History:   Procedure Laterality Date    HX AFIB ABLATION  2012    HX GYN      D&C    HX ORTHOPAEDIC      bicipital tendon repair 2016    HX OTHER SURGICAL      tonsils    HX TONSILLECTOMY      age 27     Social History     Socioeconomic History    Marital status:      Spouse name: Not on file    Number of children: Not on file    Years of education: Not on file    Highest education level: Not on file   Tobacco Use    Smoking status: Never Smoker    Smokeless tobacco: Never Used   Vaping Use    Vaping Use: Never used   Substance and Sexual Activity    Alcohol use: Yes     Comment: social    Drug use: Not Currently    Sexual activity: Yes     Partners: Male     Birth control/protection: Surgical     Social Determinants of Health     Financial Resource Strain:     Difficulty of Paying Living Expenses:    Food Insecurity:     Worried About Running Out of Food in the Last Year:     Ran Out of Food in the Last Year:    Transportation Needs:     Lack of Transportation (Medical):      Lack of Transportation (Non-Medical):    Physical Activity:     Days of Exercise per Week:     Minutes of Exercise per Session:    Stress:     Feeling of Stress :    Social Connections:     Frequency of Communication with Friends and Family:     Frequency of Social Gatherings with Friends and Family:     Attends L[employer] day Services:     Active Member of Clubs or Organizations:     Attends Club or Organization Meetings:    [facility] Marital Status:      Family History   Problem Relation Age of Onset    Diabetes Mother     Cancer Mother     Cancer Father      Current Outpatient Medications   Medication Sig Dispense Refill    gabapentin (NEURONTIN) 100 mg capsule gabapentin 100 mg capsule   TAKE 3 CAPSULE(S) EVERY DAY BY ORAL ROUTE AT BEDTIME.  cholecalciferol (VITAMIN D3) (2,000 UNITS /50 MCG) cap capsule Take 1 Capsule by mouth daily. 30 Capsule 3    rosuvastatin (CRESTOR) 5 mg tablet Take 1 Tablet by mouth nightly. 30 Tablet 3    metFORMIN ER (GLUCOPHAGE XR) 500 mg tablet Take 2 Tablets by mouth daily (with dinner). 60 Tablet 3    lisinopriL (PRINIVIL, ZESTRIL) 2.5 mg tablet Take 1 Tablet by mouth daily. 30 Tablet 2    semaglutide (Rybelsus) 14 mg tablet Take 1 Tablet by mouth Daily (before breakfast). Take 14 mg by mouth Daily (before breakfast).  30 Tablet 3     Allergies   Allergen Reactions    Flagyl [Metronidazole] Hives    Penicillin G Hives    Tuberculin Ppd Other (comments)     Contraindication  ( positive test )       Objective:  Visit Vitals  /69   Pulse 98   Temp 97.8 °F (36.6 °C) (Temporal)   Resp 20   Ht 5' 3\" (1.6 m)   Wt 205 lb (93 kg)   LMP 06/01/2021   SpO2 98%   BMI 36.31 kg/m²     Physical Exam:   General appearance - alert, well appearing in no distress  Mental status - alert, oriented to person, place, and time  Chest - clear to auscultation, no wheezes, rales or rhonchi  Heart - normal rate, regular rhythm, no murmurs  Abdomen - soft, nontender, nondistended, no organomegaly  Ext-peripheral pulses normal, no pedal edema  Neuro - no focal findings   Back-full range of motion, no tenderness, palpable spasm or pain on motion     Results for orders placed or performed in visit on 08/26/21   AMB POC URINALYSIS DIP STICK AUTO W/ MICRO   Result Value Ref Range    Color (UA POC) Yellow     Clarity (UA POC) Clear     Glucose (UA POC) Negative Negative    Bilirubin (UA POC) Negative Negative    Ketones (UA POC) Negative Negative    Specific gravity (UA POC) 1.030 1.001 - 1.035    Blood (UA POC) Negative Negative    pH (UA POC) 6.0 4.6 - 8.0    Protein (UA POC) Negative Negative    Urobilinogen (UA POC) 0.2 mg/dL 0.2 - 1    Nitrites (UA POC) Negative Negative    Leukocyte esterase (UA POC) Negative Negative     Assessment/Plan:  Diagnoses and all orders for this visit:    Acute right-sided low back pain with sciatica, sciatica laterality unspecified  -     AMB POC URINALYSIS DIP STICK AUTO W/ MICRO  -     meloxicam (MOBIC) 15 mg tablet; Take 1 Tablet by mouth daily. , Normal, Disp-30 Tablet, R-2  -     methocarbamoL (ROBAXIN) 750 mg tablet; Take 1 Tablet by mouth two (2) times a day., Normal, Disp-20 Tablet, R-1    Encounter for gynecological examination  -     REFERRAL TO OBSTETRICS AND GYNECOLOGY    Encounter for colorectal cancer screening  -     REFERRAL TO GASTROENTEROLOGY      Patient Instructions          Back Stretches: Exercises  Introduction  Here are some examples of exercises for stretching your back. Start each exercise slowly. Ease off the exercise if you start to have pain. Your doctor or physical therapist will tell you when you can start these exercises and which ones will work best for you. How to do the exercises  Overhead stretch   1. Stand comfortably with your feet shoulder-width apart. 2. Looking straight ahead, raise both arms over your head and reach toward the ceiling. Do not allow your head to tilt back. 3. Hold for 15 to 30 seconds, then lower your arms to your sides. 4. Repeat 2 to 4 times. Side stretch   1. Stand comfortably with your feet shoulder-width apart. 2. Raise one arm over your head, and then lean to the other side. 3. Slide your hand down your leg as you let the weight of your arm gently stretch your side muscles. Hold for 15 to 30 seconds. 4. Repeat 2 to 4 times on each side. Press-up   1. Lie on your stomach, supporting your body with your forearms.   2. Press your elbows down into the floor to raise your upper back. As you do this, relax your stomach muscles and allow your back to arch without using your back muscles. As your press up, do not let your hips or pelvis come off the floor. 3. Hold for 15 to 30 seconds, then relax. 4. Repeat 2 to 4 times. Relax and rest   1. Lie on your back with a rolled towel under your neck and a pillow under your knees. Extend your arms comfortably to your sides. 2. Relax and breathe normally. 3. Remain in this position for about 10 minutes. 4. If you can, do this 2 or 3 times each day. Follow-up care is a key part of your treatment and safety. Be sure to make and go to all appointments, and call your doctor if you are having problems. It's also a good idea to know your test results and keep a list of the medicines you take. Where can you learn more? Go to http://www.ace.com/  Enter Y090 in the search box to learn more about \"Back Stretches: Exercises. \"  Current as of: November 16, 2020               Content Version: 12.8  © 7934-3944 Healthwise, Inc[employer]. Care instructions adapted under license by [employer] (which disclaims liability or warranty for this information). If you have questions about a medical condition or this instruction, always ask your healthcare professional. [address] any warranty or liability for your use of this information. Follow-up and Dispositions    · Return in about 4 weeks (around 9/23/2021) for f/u treatment.

## 2021-08-26 NOTE — PATIENT INSTRUCTIONS
Back Stretches: Exercises  Introduction  Here are some examples of exercises for stretching your back. Start each exercise slowly. Ease off the exercise if you start to have pain. Your doctor or physical therapist will tell you when you can start these exercises and which ones will work best for you. How to do the exercises  Overhead stretch   1. Stand comfortably with your feet shoulder-width apart. 2. Looking straight ahead, raise both arms over your head and reach toward the ceiling. Do not allow your head to tilt back. 3. Hold for 15 to 30 seconds, then lower your arms to your sides. 4. Repeat 2 to 4 times. Side stretch   1. Stand comfortably with your feet shoulder-width apart. 2. Raise one arm over your head, and then lean to the other side. 3. Slide your hand down your leg as you let the weight of your arm gently stretch your side muscles. Hold for 15 to 30 seconds. 4. Repeat 2 to 4 times on each side. Press-up   1. Lie on your stomach, supporting your body with your forearms. 2. Press your elbows down into the floor to raise your upper back. As you do this, relax your stomach muscles and allow your back to arch without using your back muscles. As your press up, do not let your hips or pelvis come off the floor. 3. Hold for 15 to 30 seconds, then relax. 4. Repeat 2 to 4 times. Relax and rest   1. Lie on your back with a rolled towel under your neck and a pillow under your knees. Extend your arms comfortably to your sides. 2. Relax and breathe normally. 3. Remain in this position for about 10 minutes. 4. If you can, do this 2 or 3 times each day. Follow-up care is a key part of your treatment and safety. Be sure to make and go to all appointments, and call your doctor if you are having problems. It's also a good idea to know your test results and keep a list of the medicines you take. Where can you learn more?   Go to http://www.gray.[employer]/  Enter [id number] in the search box to learn more about \"Back Stretches: Exercises. \"  Current as of: November 16, 2020               Content Version: 12.8  © 2006-2021 Healthwise, Incorporated. Care instructions adapted under license by "[employer] (which disclaims liability or warranty for this information). If you have questions about a medical condition or this instruction, always ask your healthcare professional. [address] any warranty or liability for your use of this information.

## 2021-09-24 ENCOUNTER — OFFICE VISIT (OUTPATIENT)
Dept: FAMILY MEDICINE CLINIC | Age: 51
End: 2021-09-24
Payer: COMMERCIAL

## 2021-09-24 VITALS
OXYGEN SATURATION: 98 % | DIASTOLIC BLOOD PRESSURE: 66 MMHG | SYSTOLIC BLOOD PRESSURE: 135 MMHG | BODY MASS INDEX: 35.08 KG/M2 | WEIGHT: 198 LBS | RESPIRATION RATE: 20 BRPM | HEIGHT: 63 IN | HEART RATE: 74 BPM | TEMPERATURE: 97.7 F

## 2021-09-24 DIAGNOSIS — E11.9 CONTROLLED TYPE 2 DIABETES MELLITUS WITHOUT COMPLICATION, WITHOUT LONG-TERM CURRENT USE OF INSULIN (HCC): ICD-10-CM

## 2021-09-24 DIAGNOSIS — E56.9 VITAMIN DEFICIENCY: ICD-10-CM

## 2021-09-24 DIAGNOSIS — E66.01 SEVERE OBESITY (BMI 35.0-35.9 WITH COMORBIDITY) (HCC): ICD-10-CM

## 2021-09-24 DIAGNOSIS — R22.42 MASS OF THIGH, LEFT: Primary | ICD-10-CM

## 2021-09-24 PROCEDURE — 99214 OFFICE O/P EST MOD 30 MIN: CPT | Performed by: INTERNAL MEDICINE

## 2021-09-24 RX ORDER — CHOLECALCIFEROL (VITAMIN D3) 125 MCG
5000 CAPSULE ORAL DAILY
Qty: 90 CAPSULE | Refills: 1 | Status: SHIPPED | OUTPATIENT
Start: 2021-09-24 | End: 2021-10-04 | Stop reason: ALTCHOICE

## 2021-09-24 RX ORDER — METFORMIN HYDROCHLORIDE 500 MG/1
1000 TABLET, EXTENDED RELEASE ORAL
Qty: 180 TABLET | Refills: 1 | Status: SHIPPED | OUTPATIENT
Start: 2021-09-24 | End: 2021-11-11 | Stop reason: SDUPTHER

## 2021-09-24 NOTE — PATIENT INSTRUCTIONS
Lipoma: Care Instructions  Your Care Instructions  A lipoma is a growth of fat just below the skin. It may feel soft and rubbery. Lipomas can occur anywhere on the body. But they are most common on the torso, neck, upper thighs, upper arms, and armpits. A lipoma does not turn into cancer. Lipomas usually are not treated, because most of them don't hurt or cause problems. But your doctor may remove a lipoma if it is painful, gets infected, or bothers you. Follow-up care is a key part of your treatment and safety. Be sure to make and go to all appointments, and call your doctor if you are having problems. It's also a good idea to know your test results and keep a list of the medicines you take. How can you care for yourself at home? · A lipoma usually needs no care at home unless your doctor made a cut (incision) to remove it. · If your doctor told you how to care for your incision, follow your doctor's instructions. If you did not get instructions, follow this general advice:  ? Wash around the incision with clean water 2 times a day. Don't use hydrogen peroxide or alcohol. These can slow healing. ? You may cover the incision with a thin layer of petroleum jelly, such as Vaseline, and a nonstick bandage. ? Apply more petroleum jelly and replace the bandage as needed. When should you call for help? Call your doctor now or seek immediate medical care if:    · You have signs of infection, such as:  ? Increased pain, swelling, warmth, or redness. ? Red streaks leading from the lipoma. ? Pus draining from the lipoma. ? A fever. Watch closely for changes in your health, and be sure to contact your doctor if:    · The lipoma is growing or changing.     · You do not get better as expected. Where can you learn more? Go to http://www.gray.com/  Enter J054 in the search box to learn more about \"Lipoma: Care Instructions. \"  Current as of: March 3, 2021               Content Version: 13.0  © 5513-9577 Healthwise, Incorporated. Care instructions adapted under license by [employer] (which disclaims liability or warranty for this information). If you have questions about a medical condition or this instruction, always ask your healthcare professional. [address] any warranty or liability for your use of this information.

## 2021-09-24 NOTE — PROGRESS NOTES
Chief Complaint   Patient presents with    Follow-up      left Hip pain with lump on top hip and thigh  / PAP 6/2021 Dr. Chidi Barba     Medication Problem     want to change metformin XR to regular      Colonoscopy done year 2020 with [facility] with Dr. Kasandra Del Castillo done 8/19/2021 Dr. Steven Urias done 7/2/2021 with Halie Brito

## 2021-09-24 NOTE — PROGRESS NOTES
Chief Complaint   Patient presents with    Follow-up      left Hip pain with lump on top hip and thigh  / PAP 8/2021 Dr. Shawna Owen     Medication Problem     want to change metformin XR to regular      HPI:  Eliu Rowell is a 54 y.o. female presents for follow-up on left thigh pain that she attributes to a lump on thigh. Lump is reportedly located in soft tissue on lateral left thigh, it painful to touch. HM:  PAP 6/2021 Dr. Shawna Owen ) and Medication Problem (want to change metformin XR to regular )  Per pt, colonoscopy done year 2020 with [facility] with Dr. Romain Siu exam was done 8/19/2021 Dr. Len Rinne done 7/2/2021 with Candace Aguilar. Review of Systems  As per hpi    Past Medical History:   Diagnosis Date    Diabetes ([address]     Hypercholesterolemia     Hypertension      Past Surgical History:   Procedure Laterality Date    HX AFIB ABLATION  2012    HX GYN      D&C    HX ORTHOPAEDIC      bicipital tendon repair 2016    HX OTHER SURGICAL      tonsils    HX TONSILLECTOMY      age 27     Social History     Socioeconomic History    Marital status:      Spouse name: Not on file    Number of children: Not on file    Years of education: Not on file    Highest education level: Not on file   Tobacco Use    Smoking status: Never Smoker    Smokeless tobacco: Never Used   Vaping Use    Vaping Use: Never used   Substance and Sexual Activity    Alcohol use: Yes     Comment: social    Drug use: Not Currently    Sexual activity: Yes     Partners: Male     Birth control/protection: Surgical     Social Determinants of Health     Financial Resource Strain:     Difficulty of Paying Living Expenses:    Food Insecurity:     Worried About Running Out of Food in the Last Year:     Ran Out of Food in the Last Year:    Transportation Needs:     Lack of Transportation (Medical):      Lack of Transportation (Non-Medical):    Physical Activity:     Days of Exercise per Week:     Minutes of Exercise per Session:    Stress:     Feeling of Stress :    Social Connections:     Frequency of Communication with Friends and Family:     Frequency of Social Gatherings with Friends and Family:     Attends Synagogue Services:     Active Member of Clubs or Organizations:     Attends Club or Organization Meetings:     Marital Status:      Family History   Problem Relation Age of Onset    Diabetes Mother     Cancer Mother     Cancer Father      Current Outpatient Medications   Medication Sig Dispense Refill    gabapentin (NEURONTIN) 100 mg capsule gabapentin 100 mg capsule   TAKE 3 CAPSULE(S) EVERY DAY BY ORAL ROUTE AT BEDTIME.  meloxicam (MOBIC) 15 mg tablet Take 1 Tablet by mouth daily. 30 Tablet 2    methocarbamoL (ROBAXIN) 750 mg tablet Take 1 Tablet by mouth two (2) times a day. 20 Tablet 1    cholecalciferol (VITAMIN D3) (2,000 UNITS /50 MCG) cap capsule Take 1 Capsule by mouth daily. 30 Capsule 3    rosuvastatin (CRESTOR) 5 mg tablet Take 1 Tablet by mouth nightly. 30 Tablet 3    lisinopriL (PRINIVIL, ZESTRIL) 2.5 mg tablet Take 1 Tablet by mouth daily. 30 Tablet 2    semaglutide (Rybelsus) 14 mg tablet Take 1 Tablet by mouth Daily (before breakfast). Take 14 mg by mouth Daily (before breakfast).  30 Tablet 3     Allergies   Allergen Reactions    Flagyl [Metronidazole] Hives    Penicillin G Hives    Tuberculin Ppd Other (comments)     Contraindication  ( positive test )     Objective:  Visit Vitals  /66   Pulse 74   Temp 97.7 °F (36.5 °C) (Temporal)   Resp 20   Ht 5' 3\" (1.6 m)   Wt 198 lb (89.8 kg)   LMP 06/24/2021   SpO2 98%   BMI 35.07 kg/m²     Physical Exam:   General appearance - alert, well appearing in no apparent distress  Mental status - alert, oriented to person, place, and time  Chest - clear to auscultation, no wheezes, rales or rhonchi  Heart - normal rate, regular rhythm, no murmurs  Abdomen - soft, nontender, nondistended, no organomegaly  Ext-peripheral pulses normal, no pedal edema  Neuro - no focal findings     Results for orders placed or performed in visit on 08/26/21   AMB POC URINALYSIS DIP STICK AUTO W/ MICRO   Result Value Ref Range    Color (UA POC) Yellow     Clarity (UA POC) Clear     Glucose (UA POC) Negative Negative    Bilirubin (UA POC) Negative Negative    Ketones (UA POC) Negative Negative    Specific gravity (UA POC) 1.030 1.001 - 1.035    Blood (UA POC) Negative Negative    pH (UA POC) 6.0 4.6 - 8.0    Protein (UA POC) Negative Negative    Urobilinogen (UA POC) 0.2 mg/dL 0.2 - 1    Nitrites (UA POC) Negative Negative    Leukocyte esterase (UA POC) Negative Negative     Assessment/Plan:  Diagnoses and all orders for this visit:    Mass of thigh, left  -     US EXT NONVAS LT LTD; Future    Controlled type 2 diabetes mellitus without complication, without long-term current use of insulin (HCC)  -     HEMOGLOBIN A1C WITH EAG; Future  -     metFORMIN ER (GLUCOPHAGE XR) 500 mg tablet; Take 2 Tablets by mouth daily (with dinner). , Normal, Disp-180 Tablet, R-1  -     HEMOGLOBIN A1C WITH EAG    Severe obesity (BMI 35.0-35.9 with comorbidity) (HCC)    Vitamin deficiency  -     VITAMIN D, 25 HYDROXY; Future  -     cholecalciferol (VITAMIN D3) (5000 Units /125 mcg) capsule; Take 1 Capsule by mouth daily. , Normal, Disp-90 Capsule, R-1  -     VITAMIN D, 25 HYDROXY      Patient Instructions          Lipoma: Care Instructions  Your Care Instructions  A lipoma is a growth of fat just below the skin. It may feel soft and rubbery. Lipomas can occur anywhere on the body. But they are most common on the torso, neck, upper thighs, upper arms, and armpits. A lipoma does not turn into cancer. Lipomas usually are not treated, because most of them don't hurt or cause problems. But your doctor may remove a lipoma if it is painful, gets infected, or bothers you. Follow-up care is a key part of your treatment and safety.  Be sure to make and go to all appointments, and call your doctor if you are having problems. It's also a good idea to know your test results and keep a list of the medicines you take. How can you care for yourself at home? · A lipoma usually needs no care at home unless your doctor made a cut (incision) to remove it. · If your doctor told you how to care for your incision, follow your doctor's instructions. If you did not get instructions, follow this general advice:  ? Wash around the incision with clean water 2 times a day. Don't use hydrogen peroxide or alcohol. These can slow healing. ? You may cover the incision with a thin layer of petroleum jelly, such as Vaseline, and a nonstick bandage. ? Apply more petroleum jelly and replace the bandage as needed. When should you call for help? Call your doctor now or seek immediate medical care if:    · You have signs of infection, such as:  ? Increased pain, swelling, warmth, or redness. ? Red streaks leading from the lipoma. ? Pus draining from the lipoma. ? A fever. Watch closely for changes in your health, and be sure to contact your doctor if:    · The lipoma is growing or changing.     · You do not get better as expected. Where can you learn more? Go to http://www.gray.com/  Enter J054 in the search box to learn more about \"Lipoma: Care Instructions. \"  Current as of: March 3, 2021               Content Version: 13.0  © 2862-3528 [employer] Care instructions adapted under license by [employer] (which disclaims liability or warranty for this information). If you have questions about a medical condition or this instruction, always ask your healthcare professional. [place] [address] any warranty or liability for your use of this information. Follow-up and Dispositions    · Return in about 3 months (around 12/24/2021) for routine follow up.

## 2021-10-02 DIAGNOSIS — E56.9 VITAMIN DEFICIENCY: ICD-10-CM

## 2021-10-02 DIAGNOSIS — E11.9 CONTROLLED TYPE 2 DIABETES MELLITUS WITHOUT COMPLICATION, WITHOUT LONG-TERM CURRENT USE OF INSULIN (HCC): ICD-10-CM

## 2021-10-02 DIAGNOSIS — E78.00 HYPERCHOLESTEROLEMIA: ICD-10-CM

## 2021-10-04 RX ORDER — ACETAMINOPHEN 500 MG
TABLET ORAL
Qty: 90 CAPSULE | Refills: 1 | Status: SHIPPED | OUTPATIENT
Start: 2021-10-04 | End: 2021-11-11 | Stop reason: DRUGHIGH

## 2021-10-04 RX ORDER — ROSUVASTATIN CALCIUM 5 MG/1
TABLET, COATED ORAL
Qty: 90 TABLET | Refills: 1 | Status: SHIPPED | OUTPATIENT
Start: 2021-10-04 | End: 2022-03-28

## 2021-10-04 RX ORDER — LISINOPRIL 2.5 MG/1
TABLET ORAL
Qty: 90 TABLET | Refills: 1 | Status: SHIPPED | OUTPATIENT
Start: 2021-10-04 | End: 2021-11-11 | Stop reason: DRUGHIGH

## 2021-10-06 ENCOUNTER — HOSPITAL ENCOUNTER (OUTPATIENT)
Dept: ULTRASOUND IMAGING | Age: 51
Discharge: HOME | End: 2021-10-06
Attending: INTERNAL MEDICINE
Payer: COMMERCIAL

## 2021-10-06 DIAGNOSIS — R22.42 MASS OF THIGH, LEFT: ICD-10-CM

## 2021-10-06 PROCEDURE — 76882 US LMTD JT/FCL EVL NVASC XTR: CPT

## 2021-10-07 LAB
25(OH)D3+25(OH)D2 SERPL-MCNC: 20 NG/ML (ref 30–100)
EST. AVERAGE GLUCOSE BLD GHB EST-MCNC: 157 MG/DL
HBA1C MFR BLD: 7.1 % (ref 4.8–5.6)

## 2021-11-11 ENCOUNTER — OFFICE VISIT (OUTPATIENT)
Dept: FAMILY MEDICINE CLINIC | Age: 51
End: 2021-11-11
Payer: COMMERCIAL

## 2021-11-11 VITALS
SYSTOLIC BLOOD PRESSURE: 130 MMHG | DIASTOLIC BLOOD PRESSURE: 60 MMHG | TEMPERATURE: 97.5 F | OXYGEN SATURATION: 98 % | RESPIRATION RATE: 20 BRPM | BODY MASS INDEX: 35.26 KG/M2 | WEIGHT: 199 LBS | HEART RATE: 78 BPM | HEIGHT: 63 IN

## 2021-11-11 DIAGNOSIS — E11.9 CONTROLLED TYPE 2 DIABETES MELLITUS WITHOUT COMPLICATION, WITHOUT LONG-TERM CURRENT USE OF INSULIN (HCC): ICD-10-CM

## 2021-11-11 DIAGNOSIS — I10 HYPERTENSION GOAL BP (BLOOD PRESSURE) < 130/80: Primary | ICD-10-CM

## 2021-11-11 DIAGNOSIS — E56.9 VITAMIN DEFICIENCY: ICD-10-CM

## 2021-11-11 PROCEDURE — 3051F HG A1C>EQUAL 7.0%<8.0%: CPT | Performed by: INTERNAL MEDICINE

## 2021-11-11 PROCEDURE — 99214 OFFICE O/P EST MOD 30 MIN: CPT | Performed by: INTERNAL MEDICINE

## 2021-11-11 RX ORDER — LISINOPRIL 5 MG/1
5 TABLET ORAL DAILY
Qty: 90 TABLET | Refills: 1 | Status: SHIPPED | OUTPATIENT
Start: 2021-11-11 | End: 2022-04-21

## 2021-11-11 RX ORDER — METFORMIN HYDROCHLORIDE 500 MG/1
1000 TABLET, EXTENDED RELEASE ORAL
Qty: 180 TABLET | Refills: 1 | Status: SHIPPED | OUTPATIENT
Start: 2021-11-11 | End: 2022-05-06 | Stop reason: DRUGHIGH

## 2021-11-11 RX ORDER — CHOLECALCIFEROL (VITAMIN D3) 125 MCG
5000 CAPSULE ORAL DAILY
Qty: 90 CAPSULE | Refills: 1 | Status: SHIPPED | OUTPATIENT
Start: 2021-11-11 | End: 2022-05-06 | Stop reason: SDUPTHER

## 2021-11-11 NOTE — LETTER
11/11/2021    Ms. Lisa Mcclure  [address] [place] [id number]    Dear Lisa Mcclure:    Please find your most recent results below. A1c is up compared to last, Vit D shows some improvement    Resulted Orders   HEMOGLOBIN A1C WITH EAG   Result Value Ref Range    Hemoglobin A1c 7.1 (H) 4.8 - 5.6 %    Estimated average glucose 157 mg/dL    Narrative    Performed at:  [address]  [id number]  : Dung Ramos MD, Phone:  [phone]   VITAMIN D, 25 HYDROXY   Result Value Ref Range    VITAMIN D, 25-HYDROXY 20.0 (L) 30.0 - 100.0 ng/mL    Narrative    Performed at:  [address]  [id number]  : Dung Ramos MD, Phone:  [phone]     RECOMMENDATIONS:  Work on diet and exercise. Continue with current  medications.     Please call me if you have any questions: [phone]    Sincerely,      Michel Zavala MD

## 2021-11-11 NOTE — PROGRESS NOTES
Chief Complaint   Patient presents with    Results     labs and US     HPI:  Terrance Angel is a 54 y.o. AA female presents for review of results. A1c is up compared to last, advised to diet, exercise and continue medications. Vit D shows some improvement. Advised to continue supplement. Ultrasound shows no abnormality of left thigh. Review of Systems  As per hpi    Past Medical History:   Diagnosis Date    Diabetes ([address]     Hypercholesterolemia     Hypertension      Past Surgical History:   Procedure Laterality Date    HX AFIB ABLATION  2012    HX GYN      D&C    HX ORTHOPAEDIC      bicipital tendon repair 2016    HX OTHER SURGICAL      tonsils    HX TONSILLECTOMY      age 27     Social History     Socioeconomic History    Marital status:    Tobacco Use    Smoking status: Never Smoker    Smokeless tobacco: Never Used   Vaping Use    Vaping Use: Never used   Substance and Sexual Activity    Alcohol use: Yes     Comment: social    Drug use: Not Currently    Sexual activity: Yes     Partners: Male     Birth control/protection: Surgical     Family History   Problem Relation Age of Onset    Diabetes Mother     Cancer Mother     Cancer Father      Current Outpatient Medications   Medication Sig Dispense Refill    rosuvastatin (CRESTOR) 5 mg tablet TAKE 1 TABLET BY MOUTH EVERY DAY AT NIGHT 90 Tablet 1    lisinopriL (PRINIVIL, ZESTRIL) 2.5 mg tablet TAKE 1 TABLET BY MOUTH EVERY DAY 90 Tablet 1    cholecalciferol (VITAMIN D3) (2,000 UNITS /50 MCG) cap capsule TAKE 1 CAPSULE BY MOUTH EVERY DAY 90 Capsule 1    metFORMIN ER (GLUCOPHAGE XR) 500 mg tablet Take 2 Tablets by mouth daily (with dinner). 180 Tablet 1    gabapentin (NEURONTIN) 100 mg capsule gabapentin 100 mg capsule   TAKE 3 CAPSULE(S) EVERY DAY BY ORAL ROUTE AT BEDTIME.  methocarbamoL (ROBAXIN) 750 mg tablet Take 1 Tablet by mouth two (2) times a day.  20 Tablet 1    semaglutide (Rybelsus) 14 mg tablet Take 1 Tablet by mouth Daily (before breakfast). Take 14 mg by mouth Daily (before breakfast). 30 Tablet 3     Allergies   Allergen Reactions    Flagyl [Metronidazole] Hives    Penicillin G Hives    Tuberculin Ppd Other (comments)     Contraindication  ( positive test )       Objective:  Visit Vitals  /60   Pulse 78   Temp 97.5 °F (36.4 °C)   Resp 20   Ht 5' 3\" (1.6 m)   Wt 199 lb (90.3 kg)   SpO2 98%   BMI 35.25 kg/m²     Physical Exam:   General appearance - alert, well appearing in no distress  Mental status - alert, oriented to person, place, and time  Chest - clear to auscultation, no wheezes, rales or rhonchi  Heart - normal rate, regular rhythm, no murmurs  Abdomen - soft, nontender, nondistended, no organomegaly    Results for orders placed or performed in visit on 09/24/21   HEMOGLOBIN A1C WITH EAG   Result Value Ref Range    Hemoglobin A1c 7.1 (H) 4.8 - 5.6 %    Estimated average glucose 157 mg/dL   VITAMIN D, 25 HYDROXY   Result Value Ref Range    VITAMIN D, 25-HYDROXY 20.0 (L) 30.0 - 100.0 ng/mL     Assessment/Plan:  Diagnoses and all orders for this visit:    Hypertension goal BP (blood pressure) < 130/80  -     lisinopriL (PRINIVIL, ZESTRIL) 5 mg tablet; Take 1 Tablet by mouth daily. , Normal, Disp-90 Tablet, R-1    Vitamin deficiency  -     cholecalciferol (VITAMIN D3) (5000 Units /125 mcg) capsule; Take 1 Capsule by mouth daily. , Normal, Disp-90 Capsule, R-1    Controlled type 2 diabetes mellitus without complication, without long-term current use of insulin (HCC)  -     metFORMIN ER (GLUCOPHAGE XR) 500 mg tablet; Take 2 Tablets by mouth daily (with dinner). , Normal, Disp-180 Tablet, R-1  -     semaglutide (Rybelsus) 14 mg tablet; Take 1 Tablet by mouth Daily (before breakfast). Take 14 mg by mouth Daily (before breakfast). , Normal, Disp-30 Tablet, R-3      Patient Instructions   Take 2 of lisinopril 2.5 mg a day.  New script is 5 mg  Vit D 5000 units is in pharmacy    Follow-up and Dispositions    · Return in about 6 months (around 5/11/2022), or if symptoms worsen or fail to improve, for routine follow up.

## 2022-03-18 PROBLEM — Z98.890 H/O COLONOSCOPY: Status: ACTIVE | Noted: 2021-08-26

## 2022-03-19 PROBLEM — I10 HYPERTENSION: Status: ACTIVE | Noted: 2021-06-11

## 2022-03-19 PROBLEM — E11.9 DIABETES MELLITUS (HCC): Status: ACTIVE | Noted: 2021-06-11

## 2022-03-19 PROBLEM — Z98.890 STATUS POST ENDOMETRIAL ABLATION: Status: ACTIVE | Noted: 2021-08-26

## 2022-03-28 DIAGNOSIS — E78.00 HYPERCHOLESTEROLEMIA: ICD-10-CM

## 2022-03-28 DIAGNOSIS — E11.9 CONTROLLED TYPE 2 DIABETES MELLITUS WITHOUT COMPLICATION, WITHOUT LONG-TERM CURRENT USE OF INSULIN (HCC): ICD-10-CM

## 2022-03-28 RX ORDER — ROSUVASTATIN CALCIUM 5 MG/1
TABLET, COATED ORAL
Qty: 90 TABLET | Refills: 1 | Status: SHIPPED | OUTPATIENT
Start: 2022-03-28 | End: 2022-05-06 | Stop reason: SDUPTHER

## 2022-05-06 ENCOUNTER — OFFICE VISIT (OUTPATIENT)
Dept: FAMILY MEDICINE CLINIC | Age: 52
End: 2022-05-06
Payer: COMMERCIAL

## 2022-05-06 VITALS
HEIGHT: 63 IN | SYSTOLIC BLOOD PRESSURE: 137 MMHG | TEMPERATURE: 97.7 F | DIASTOLIC BLOOD PRESSURE: 80 MMHG | RESPIRATION RATE: 20 BRPM | HEART RATE: 76 BPM | BODY MASS INDEX: 34.2 KG/M2 | WEIGHT: 193 LBS | OXYGEN SATURATION: 99 %

## 2022-05-06 DIAGNOSIS — E56.9 VITAMIN DEFICIENCY: ICD-10-CM

## 2022-05-06 DIAGNOSIS — I10 HYPERTENSION GOAL BP (BLOOD PRESSURE) < 130/80: ICD-10-CM

## 2022-05-06 DIAGNOSIS — E11.9 DIABETIC EYE EXAM (HCC): ICD-10-CM

## 2022-05-06 DIAGNOSIS — Z01.00 DIABETIC EYE EXAM (HCC): ICD-10-CM

## 2022-05-06 DIAGNOSIS — E78.00 HYPERCHOLESTEROLEMIA: ICD-10-CM

## 2022-05-06 DIAGNOSIS — E11.9 CONTROLLED TYPE 2 DIABETES MELLITUS WITHOUT COMPLICATION, WITHOUT LONG-TERM CURRENT USE OF INSULIN (HCC): Primary | ICD-10-CM

## 2022-05-06 DIAGNOSIS — E11.9 ENCOUNTER FOR DIABETIC FOOT EXAM (HCC): ICD-10-CM

## 2022-05-06 LAB
GLUCOSE POC: 114 MG/DL
HBA1C MFR BLD HPLC: 6.8 %

## 2022-05-06 PROCEDURE — 99214 OFFICE O/P EST MOD 30 MIN: CPT | Performed by: INTERNAL MEDICINE

## 2022-05-06 PROCEDURE — 82962 GLUCOSE BLOOD TEST: CPT | Performed by: INTERNAL MEDICINE

## 2022-05-06 PROCEDURE — 3044F HG A1C LEVEL LT 7.0%: CPT | Performed by: INTERNAL MEDICINE

## 2022-05-06 PROCEDURE — 83036 HEMOGLOBIN GLYCOSYLATED A1C: CPT | Performed by: INTERNAL MEDICINE

## 2022-05-06 RX ORDER — METFORMIN HYDROCHLORIDE 1000 MG/1
1000 TABLET ORAL DAILY
Qty: 90 TABLET | Refills: 1 | Status: SHIPPED | OUTPATIENT
Start: 2022-05-06

## 2022-05-06 RX ORDER — LISINOPRIL 5 MG/1
5 TABLET ORAL DAILY
Qty: 90 TABLET | Refills: 1 | Status: SHIPPED | OUTPATIENT
Start: 2022-05-06

## 2022-05-06 RX ORDER — CHOLECALCIFEROL (VITAMIN D3) 125 MCG
5000 CAPSULE ORAL DAILY
Qty: 90 CAPSULE | Refills: 1 | Status: SHIPPED | OUTPATIENT
Start: 2022-05-06

## 2022-05-06 RX ORDER — ROSUVASTATIN CALCIUM 5 MG/1
5 TABLET, COATED ORAL
Qty: 90 TABLET | Refills: 1 | Status: SHIPPED | OUTPATIENT
Start: 2022-05-06

## 2022-05-06 NOTE — PATIENT INSTRUCTIONS
Learning About Vitamin D  Why is it important to get enough vitamin D? Your body needs vitamin D to absorb calcium. Calcium keeps your bones and muscles, including your heart, healthy and strong. If your muscles don't get enough calcium, they can cramp, hurt, or feel weak. You may have long-term (chronic) muscle aches and pains. If you don't get enough vitamin D throughout life, you have an increased chance of having thin and brittle bones (osteoporosis) in your later years. Children who don't get enough vitamin D may not grow as much as others their age. They also have a chance of getting a rare disease called rickets. It causes weak bones. Vitamin D and calcium are added to many foods. And your body uses sunshine to make its own vitamin D. How much vitamin D do you need? The recommended dietary allowance (RDA) for vitamin D is 600 IU (international units) every day for people ages 3 through 79. Adults 71 and older need 800 IU every day. How can you get more vitamin D? Foods that contain vitamin D include:  · [place], tuna, and mackerel. These are some of the best foods to eat when you need to get more vitamin D.  · Cheese, egg yolks, and beef liver. These foods have vitamin D in small amounts. · Milk, soy drinks, orange juice, yogurt, margarine, and some kinds of cereal have vitamin D added to them. Some people don't make vitamin D as well as others. They may have to take extra care in getting enough vitamin D. Things that reduce how much vitamin D your body makes include:  · Having dark skin. · Age, especially if you are older than 72. · Digestive problems, such as Crohn's or celiac disease. · Liver and kidney disease. Some people who do not get enough vitamin D may need supplements. Are there any risks from taking vitamin D?  · Too much vitamin D:  ? Can damage your kidneys. ? Can cause nausea and vomiting, constipation, and weakness. ? Raises the amount of calcium in your blood.  If this happens, you can get confused or have an irregular heart rhythm. · Vitamin D may interact with other medicines. Tell your doctor about all of the medicines you take, including over-the-counter drugs, herbs, and pills. Tell your doctor about all of your current medical problems. Where can you learn more? Go to http://www.ace.com/  Enter H430 in the search box to learn more about \"Learning About Vitamin D.\"  Current as of: September 8, 2021               Content Version: 13.2  © 2006-2022 [employer] Care instructions adapted under license by [employer] (which disclaims liability or warranty for this information). If you have questions about a medical condition or this instruction, always ask your healthcare professional. [address] any warranty or liability for your use of this information.

## 2022-05-06 NOTE — PROGRESS NOTES
Chief Complaint   Patient presents with    Follow-up     Routine check up      HPI:  Rafaela Mcneil is a 50 y.o. female with h/o hypertension, hypercholesterolemia, diabetes type 2 presents for follow-up  Patient is doing well. Blood pressure is at goal. She has no complaint. She is requesting medication refills    Review of Systems  As per hpi    Past Medical History:   Diagnosis Date    Diabetes ([address]     Hypercholesterolemia     Hypertension      Past Surgical History:   Procedure Laterality Date    HX AFIB ABLATION  2012    HX GYN      D&C    HX ORTHOPAEDIC      bicipital tendon repair 2016    HX OTHER SURGICAL      tonsils    HX TONSILLECTOMY      age 27     Social History     Socioeconomic History    Marital status:    Tobacco Use    Smoking status: Never Smoker    Smokeless tobacco: Never Used   Vaping Use    Vaping Use: Never used   Substance and Sexual Activity    Alcohol use: Yes     Comment: social    Drug use: Not Currently    Sexual activity: Yes     Partners: Male     Birth control/protection: Surgical     Family History   Problem Relation Age of Onset    Diabetes Mother     Cancer Mother     Cancer Father      Current Outpatient Medications   Medication Sig Dispense Refill    lisinopriL (PRINIVIL, ZESTRIL) 5 mg tablet TAKE 1 TABLET BY MOUTH EVERY DAY 90 Tablet 0    rosuvastatin (CRESTOR) 5 mg tablet TAKE 1 TABLET BY MOUTH EVERY DAY AT NIGHT 90 Tablet 1    semaglutide (Rybelsus) 14 mg tablet Take 1 Tablet by mouth Daily (before breakfast). Take 14 mg by mouth Daily (before breakfast). 30 Tablet 3    cholecalciferol (VITAMIN D3) (5000 Units /125 mcg) capsule Take 1 Capsule by mouth daily. 90 Capsule 1    gabapentin (NEURONTIN) 100 mg capsule gabapentin 100 mg capsule   TAKE 3 CAPSULE(S) EVERY DAY BY ORAL ROUTE AT BEDTIME.  methocarbamoL (ROBAXIN) 750 mg tablet Take 1 Tablet by mouth two (2) times a day.  20 Tablet 1     Allergies   Allergen Reactions    Flagyl [Metronidazole] Hives    Penicillin G Hives    Tuberculin Ppd Other (comments)     Contraindication  ( positive test )       Objective:  Visit Vitals  /80   Pulse 76   Temp 97.7 °F (36.5 °C) (Temporal)   Resp 20   Ht 5' 3\" (1.6 m)   Wt 193 lb (87.5 kg)   SpO2 99%   BMI 34.19 kg/m²     Physical Exam:   General appearance - alert, well appearing in no distress  Mental status - alert, oriented to person, place, and time  Chest - clear to auscultation, no wheezes, rales or rhonchi  Heart - normal rate, regular rhythm, no murmurs  Abdomen - soft, nontender, nondistended, no organomegaly  Ext-peripheral pulses normal, no pedal edema  Neuro - no focal findings     Results for orders placed or performed in visit on 05/06/22   AMB POC HEMOGLOBIN A1C   Result Value Ref Range    Hemoglobin A1c (POC) 6.8 %   AMB POC GLUCOSE BLOOD, BY GLUCOSE MONITORING DEVICE   Result Value Ref Range    Glucose  MG/DL     Assessment/Plan:  Diagnoses and all orders for this visit:    Controlled type 2 diabetes mellitus without complication, without long-term current use of insulin (HCC)  -     AMB POC HEMOGLOBIN A1C  -     AMB POC GLUCOSE BLOOD, BY GLUCOSE MONITORING DEVICE  -     metFORMIN (GLUCOPHAGE) 1,000 mg tablet; Take 1 Tablet by mouth daily. , Normal, Disp-90 Tablet, R-1  -     lisinopriL (PRINIVIL, ZESTRIL) 5 mg tablet; Take 1 Tablet by mouth daily. , Normal, Disp-90 Tablet, R-1  -     rosuvastatin (CRESTOR) 5 mg tablet; Take 1 Tablet by mouth nightly., Normal, Disp-90 Tablet, R-1  -     semaglutide (Rybelsus) 14 mg tablet; Take 1 Tablet by mouth Daily (before breakfast). Take 14 mg by mouth Daily (before breakfast). , Normal, Disp-90 Tablet, R-1    Hypercholesterolemia  -     rosuvastatin (CRESTOR) 5 mg tablet; Take 1 Tablet by mouth nightly., Normal, Disp-90 Tablet, R-1    Vitamin deficiency  -     cholecalciferol (VITAMIN D3) (5000 Units /125 mcg) capsule; Take 1 Capsule by mouth daily. , Normal, Disp-90 Capsule, R-1    Diabetic eye exam ([facility]  -     REFERRAL TO OPHTHALMOLOGY    Encounter for diabetic foot exam ([facility]  -     REFERRAL TO PODIATRY    Hypertension goal BP (blood pressure) < 130/80  -     lisinopriL (PRINIVIL, ZESTRIL) 5 mg tablet; Take 1 Tablet by mouth daily. , Normal, Disp-90 Tablet, R-1      Patient Instructions        Learning About Vitamin D  Why is it important to get enough vitamin D? Your body needs vitamin D to absorb calcium. Calcium keeps your bones and muscles, including your heart, healthy and strong. If your muscles don't get enough calcium, they can cramp, hurt, or feel weak. You may have long-term (chronic) muscle aches and pains. If you don't get enough vitamin D throughout life, you have an increased chance of having thin and brittle bones (osteoporosis) in your later years. Children who don't get enough vitamin D may not grow as much as others their age. They also have a chance of getting a rare disease called rickets. It causes weak bones. Vitamin D and calcium are added to many foods. And your body uses sunshine to make its own vitamin D. How much vitamin D do you need? The recommended dietary allowance (RDA) for vitamin D is 600 IU (international units) every day for people ages 3 through 79. Adults 71 and older need 800 IU every day. How can you get more vitamin D? Foods that contain vitamin D include:  · [place], tuna, and mackerel. These are some of the best foods to eat when you need to get more vitamin D.  · Cheese, egg yolks, and beef liver. These foods have vitamin D in small amounts. · Milk, soy drinks, orange juice, yogurt, margarine, and some kinds of cereal have vitamin D added to them. Some people don't make vitamin D as well as others. They may have to take extra care in getting enough vitamin D. Things that reduce how much vitamin D your body makes include:  · Having dark skin. · Age, especially if you are older than 72.   · Digestive problems, such as Crohn's or celiac disease. · Liver and kidney disease. Some people who do not get enough vitamin D may need supplements. Are there any risks from taking vitamin D?  · Too much vitamin D:  ? Can damage your kidneys. ? Can cause nausea and vomiting, constipation, and weakness. ? Raises the amount of calcium in your blood. If this happens, you can get confused or have an irregular heart rhythm. · Vitamin D may interact with other medicines. Tell your doctor about all of the medicines you take, including over-the-counter drugs, herbs, and pills. Tell your doctor about all of your current medical problems. Where can you learn more? Go to http://www.ace.com/  Enter I730 in the search box to learn more about \"Learning About Vitamin D.\"  Current as of: September 8, 2021               Content Version: 13.2  © 2006-2022 [employer]. Care instructions adapted under license by [employer] (which disclaims liability or warranty for this information). If you have questions about a medical condition or this instruction, always ask your healthcare professional. [place] [address] any warranty or liability for your use of this information.         Follow-up and Dispositions    · Return in about 6 months (around 11/6/2022) for f/u for physical exam.

## 2022-09-08 ENCOUNTER — TELEPHONE (OUTPATIENT)
Dept: FAMILY MEDICINE CLINIC | Age: 52
End: 2022-09-08

## 2022-09-08 NOTE — TELEPHONE ENCOUNTER
----- Message from Carlo Betancourt sent at 9/8/2022  8:49 AM EDT -----  Subject: Message to Provider    QUESTIONS  Information for Provider? Patient called to speak to someone in office   because she stated she has missed work the last 3 days because she has   Covid symptoms. She stated she is feeling better now, but still has a bit   of a cough but has also lost her taste. She has taken at home tests and   they have all come back negative. Her job is telling her she needs a drsSarahi note for work. Please advise patient on what to do.   ---------------------------------------------------------------------------  --------------  Natalie BULLOCK  [phone]; OK to leave message on voicemail  ---------------------------------------------------------------------------  --------------  SCRIPT ANSWERS  Relationship to Patient?  Self

## 2022-09-09 ENCOUNTER — VIRTUAL VISIT (OUTPATIENT)
Dept: FAMILY MEDICINE CLINIC | Age: 52
End: 2022-09-09
Payer: COMMERCIAL

## 2022-09-09 DIAGNOSIS — E78.00 HYPERCHOLESTEROLEMIA: ICD-10-CM

## 2022-09-09 DIAGNOSIS — E11.9 CONTROLLED TYPE 2 DIABETES MELLITUS WITHOUT COMPLICATION, WITHOUT LONG-TERM CURRENT USE OF INSULIN (HCC): ICD-10-CM

## 2022-09-09 DIAGNOSIS — U07.1 ACUTE BRONCHITIS DUE TO COVID-19 VIRUS: ICD-10-CM

## 2022-09-09 DIAGNOSIS — Z13.29 SCREENING FOR THYROID DISORDER: ICD-10-CM

## 2022-09-09 DIAGNOSIS — Z00.00 ENCOUNTER FOR MEDICAL EXAMINATION TO ESTABLISH CARE: Primary | ICD-10-CM

## 2022-09-09 DIAGNOSIS — E56.9 VITAMIN DEFICIENCY: ICD-10-CM

## 2022-09-09 DIAGNOSIS — J20.8 ACUTE BRONCHITIS DUE TO COVID-19 VIRUS: ICD-10-CM

## 2022-09-09 DIAGNOSIS — R35.0 FREQUENCY OF URINATION: ICD-10-CM

## 2022-09-09 DIAGNOSIS — I10 HYPERTENSION GOAL BP (BLOOD PRESSURE) < 130/80: ICD-10-CM

## 2022-09-09 PROCEDURE — 99396 PREV VISIT EST AGE 40-64: CPT | Performed by: INTERNAL MEDICINE

## 2022-09-09 RX ORDER — AZITHROMYCIN 250 MG/1
TABLET, FILM COATED ORAL
Qty: 6 TABLET | Refills: 0 | Status: SHIPPED | OUTPATIENT
Start: 2022-09-09 | End: 2022-09-14

## 2022-09-09 NOTE — LETTER
NOTIFICATION RETURN TO WORK     9/9/2022    Ms. Dequan Panchal  [address] [place] [phone]      To Whom It May Concern: Dequan Panchal was tested for COVID-19 on 9/8/22, the result was positive. Date of return to work will be based on a negative result of a repeat test in 1-2 days. If there are questions or concerns, please have the patient contact our office.         Sincerely,        Hank Andrews MD

## 2022-09-09 NOTE — PROGRESS NOTES
Chief Complaint   Patient presents with    Post-COVID Symptoms     Dry skin feeling , throat pain ,nagging cough, taste different, went to Health dept results (possible positive) did further test waiting for results. Taking OTC medication during. Letter for School/Work     HPI:  Quinton Hahn is a 50 y.o. female who was seen by synchronous (real-time) audio-video technology on 9/9/2022 for Post-COVID Symptoms (Dry skin feeling , throat pain ,nagging cough, taste different, went to Health dept results (possible positive) did further test waiting for results. Taking OTC medication during.) and Letter for School/Work    Assessment & Plan:   Diagnoses and all orders for this visit:    Encounter for medical examination to establish care  -     METABOLIC PANEL, COMPREHENSIVE; Future  -     CBC WITH AUTOMATED DIFF; Future  -     METABOLIC PANEL, COMPREHENSIVE  -     CBC WITH AUTOMATED DIFF    Acute bronchitis due to COVID-19 virus  -     azithromycin (ZITHROMAX) 250 mg tablet; Take 2 tablets today, then take 1 tablet daily, Normal, Disp-6 Tablet, R-0  -     CBC WITH AUTOMATED DIFF; Future  -     CBC WITH AUTOMATED DIFF    Hypertension goal BP (blood pressure) < 581/11  -     METABOLIC PANEL, COMPREHENSIVE; Future  -     CBC WITH AUTOMATED DIFF; Future  -     METABOLIC PANEL, COMPREHENSIVE  -     CBC WITH AUTOMATED DIFF    Controlled type 2 diabetes mellitus without complication, without long-term current use of insulin (HCC)  -     METABOLIC PANEL, COMPREHENSIVE; Future  -     CBC WITH AUTOMATED DIFF; Future  -     HEMOGLOBIN A1C WITH EAG; Future  -     MICROALBUMIN, UR, RAND W/ MICROALB/CREAT RATIO; Future  -     METABOLIC PANEL, COMPREHENSIVE  -     CBC WITH AUTOMATED DIFF  -     HEMOGLOBIN A1C WITH EAG  -     MICROALBUMIN, UR, RAND W/ MICROALB/CREAT RATIO    Hypercholesterolemia  -     LIPID PANEL;  Future  -     LIPID PANEL    Vitamin deficiency  -     VITAMIN D, 25 HYDROXY; Future  -     VITAMIN D, 25 HYDROXY    Screening for thyroid disorder  -     TSH 3RD GENERATION; Future  -     TSH 3RD GENERATION    Frequency of urination  -     URINALYSIS W/ RFLX MICROSCOPIC; Future  -     URINALYSIS W/ RFLX MICROSCOPIC    I spent at least 20 minutes on this visit with this established patient. 712  Subjective: Chester De La Vega is a 50 y.o. AA female with h/o hypertension, hypercholesterolemia, diabetes type 2 is seen for post covid-19 syndrome. Initial home testing was negative. Healthy department testing 9/8/22 was positive. She has not been to work 9/6/22. Advised to get a negative test before returning to work. Prior to Admission medications    Medication Sig Start Date End Date Taking? Authorizing Provider   metFORMIN (GLUCOPHAGE) 1,000 mg tablet Take 1 Tablet by mouth daily. 5/6/22  Yes Patricia Portillo MD   lisinopriL (PRINIVIL, ZESTRIL) 5 mg tablet Take 1 Tablet by mouth daily. 5/6/22  Yes Patricia Portillo MD   rosuvastatin (CRESTOR) 5 mg tablet Take 1 Tablet by mouth nightly. 5/6/22  Yes Patricia Portillo MD   semaglutide (Rybelsus) 14 mg tablet Take 1 Tablet by mouth Daily (before breakfast). Take 14 mg by mouth Daily (before breakfast). 5/6/22  Yes Patricia Portillo MD   cholecalciferol (VITAMIN D3) (5000 Units /125 mcg) capsule Take 1 Capsule by mouth daily. 5/6/22  Yes Cindy Dash MD   gabapentin (NEURONTIN) 100 mg capsule gabapentin 100 mg capsule   TAKE 3 CAPSULE(S) EVERY DAY BY ORAL ROUTE AT BEDTIME. Yes Provider, Historical   methocarbamoL (ROBAXIN) 750 mg tablet Take 1 Tablet by mouth two (2) times a day. 8/26/21  Yes Patricia Portillo MD     Patient Active Problem List    Diagnosis Date Noted    H/O colonoscopy 08/26/2021    Status post endometrial ablation 08/26/2021    Hypertension 06/11/2021    Diabetes mellitus ([address] 06/11/2021     Current Outpatient Medications   Medication Sig Dispense Refill    metFORMIN (GLUCOPHAGE) 1,000 mg tablet Take 1 Tablet by mouth daily.  90 Tablet 1    lisinopriL (PRINIVIL, ZESTRIL) 5 mg tablet Take 1 Tablet by mouth daily. 90 Tablet 1    rosuvastatin (CRESTOR) 5 mg tablet Take 1 Tablet by mouth nightly. 90 Tablet 1    semaglutide (Rybelsus) 14 mg tablet Take 1 Tablet by mouth Daily (before breakfast). Take 14 mg by mouth Daily (before breakfast). 90 Tablet 1    cholecalciferol (VITAMIN D3) (5000 Units /125 mcg) capsule Take 1 Capsule by mouth daily. 90 Capsule 1    gabapentin (NEURONTIN) 100 mg capsule gabapentin 100 mg capsule   TAKE 3 CAPSULE(S) EVERY DAY BY ORAL ROUTE AT BEDTIME.      methocarbamoL (ROBAXIN) 750 mg tablet Take 1 Tablet by mouth two (2) times a day. 20 Tablet 1     Allergies   Allergen Reactions    Flagyl [Metronidazole] Hives    Penicillin G Hives    Tuberculin Ppd Other (comments)     Contraindication  ( positive test )     Past Medical History:   Diagnosis Date    Diabetes ([facility] [address]     Hypercholesterolemia     Hypertension      Past Surgical History:   Procedure Laterality Date    HX AFIB ABLATION  2012    HX GYN      D&C    HX ORTHOPAEDIC      bicipital tendon repair 2016    HX OTHER SURGICAL      tonsils    HX TONSILLECTOMY      age 27     Family History   Problem Relation Age of Onset    Diabetes Mother     Cancer Mother     Cancer Father      Social History     Tobacco Use    Smoking status: Never    Smokeless tobacco: Never   Substance Use Topics    Alcohol use: Yes     Comment: social     ROS  As per hpi    Objective:   No flowsheet data found. General: alert, cooperative, no distress   Mental  status: normal mood, behavior, speech, dress, motor activity, and thought processes, able to follow commands   HENT: NCAT   Neck: no visualized mass   Resp: no respiratory distress   Neuro: no gross deficits   Skin: no discoloration or lesions of concern on visible areas   Psychiatric: normal affect, consistent with stated mood, no evidence of hallucinations     Additional exam findings:    We discussed the expected course, resolution and complications of the diagnosis(es) in detail. Medication risks, benefits, costs, interactions, and alternatives were discussed as indicated. I advised her to contact the office if her condition worsens, changes or fails to improve as anticipated. She expressed understanding with the diagnosis(es) and plan. Minda Camacho, was evaluated through a synchronous (real-time) audio-video encounter. The patient (or guardian if applicable) is aware that this is a billable service, which includes applicable co-pays. This Virtual Visit was conducted with patient's (and/or legal guardian's) consent. The visit was conducted pursuant to the emergency declaration under the [facility], [address] [place] [address] authority and the [employer] and [employer] General Act. Patient identification was verified, and a caregiver was present when appropriate. The patient was located at: Home: [address] [place] [id number]  The provider was located at:  Facility (Appt Department): [facility] [address]  [address] [facility]        Jaren Palumbo MD

## 2022-11-15 ENCOUNTER — OFFICE VISIT (OUTPATIENT)
Dept: FAMILY MEDICINE CLINIC | Age: 52
End: 2022-11-15
Payer: COMMERCIAL

## 2022-11-15 VITALS
SYSTOLIC BLOOD PRESSURE: 150 MMHG | BODY MASS INDEX: 36.32 KG/M2 | WEIGHT: 205 LBS | DIASTOLIC BLOOD PRESSURE: 80 MMHG | TEMPERATURE: 96.9 F | HEART RATE: 68 BPM | HEIGHT: 63 IN | RESPIRATION RATE: 20 BRPM

## 2022-11-15 DIAGNOSIS — E11.9 CONTROLLED TYPE 2 DIABETES MELLITUS WITHOUT COMPLICATION, WITHOUT LONG-TERM CURRENT USE OF INSULIN (HCC): Primary | ICD-10-CM

## 2022-11-15 DIAGNOSIS — K59.00 CONSTIPATION, UNSPECIFIED CONSTIPATION TYPE: ICD-10-CM

## 2022-11-15 DIAGNOSIS — E66.01 SEVERE OBESITY (BMI 35.0-39.9) WITH COMORBIDITY (HCC): ICD-10-CM

## 2022-11-15 DIAGNOSIS — I10 HYPERTENSION GOAL BP (BLOOD PRESSURE) < 130/80: ICD-10-CM

## 2022-11-15 LAB — GLUCOSE POC: 262 MG/DL

## 2022-11-15 PROCEDURE — 82962 GLUCOSE BLOOD TEST: CPT | Performed by: INTERNAL MEDICINE

## 2022-11-15 PROCEDURE — 3046F HEMOGLOBIN A1C LEVEL >9.0%: CPT | Performed by: INTERNAL MEDICINE

## 2022-11-15 PROCEDURE — 99214 OFFICE O/P EST MOD 30 MIN: CPT | Performed by: INTERNAL MEDICINE

## 2022-11-15 PROCEDURE — 3078F DIAST BP <80 MM HG: CPT | Performed by: INTERNAL MEDICINE

## 2022-11-15 PROCEDURE — 3074F SYST BP LT 130 MM HG: CPT | Performed by: INTERNAL MEDICINE

## 2022-11-15 RX ORDER — VALSARTAN 40 MG/1
40 TABLET ORAL DAILY
Qty: 90 TABLET | Refills: 1 | Status: SHIPPED | OUTPATIENT
Start: 2022-11-15

## 2022-11-15 RX ORDER — METFORMIN HYDROCHLORIDE 1000 MG/1
1000 TABLET ORAL DAILY
Qty: 90 TABLET | Refills: 1 | Status: SHIPPED | OUTPATIENT
Start: 2022-11-15

## 2022-11-15 NOTE — PROGRESS NOTES
Chief Complaint   Patient presents with    Follow-up     Physical exam      HPI:  Nik Pierson is a 50 y.o. AA female diabetes, hypertension, hypercholesterolemia presents for overdue follow up. .  Blood pressure is elevated. Patient reports she stopped taking all her medications for last 3 months. BS and A1C reading is 262 and 8%. She has c/o constipation.      Review of Systems  As per hpi    Past Medical History:   Diagnosis Date    Diabetes ([address]     Hypercholesterolemia     Hypertension      Past Surgical History:   Procedure Laterality Date    HX AFIB ABLATION  2012    HX GYN      D&C    HX ORTHOPAEDIC      bicipital tendon repair 2016    HX OTHER SURGICAL      tonsils    HX TONSILLECTOMY      age 27     Social History     Socioeconomic History    Marital status:    Tobacco Use    Smoking status: Never    Smokeless tobacco: Never   Vaping Use    Vaping Use: Never used   Substance and Sexual Activity    Alcohol use: Yes     Comment: social    Drug use: Not Currently    Sexual activity: Yes     Partners: Male     Birth control/protection: Surgical     Family History   Problem Relation Age of Onset    Diabetes Mother     Cancer Mother     Cancer Father        Allergies   Allergen Reactions    Flagyl [Metronidazole] Hives    Penicillin G Hives    Tuberculin Ppd Other (comments)     Contraindication  ( positive test )       Objective:  Visit Vitals  BP (!) 150/80   Pulse 68   Temp 96.9 °F (36.1 °C) (Oral)   Resp 20   Ht 5' 3\" (1.6 m)   Wt 205 lb (93 kg)   BMI 36.31 kg/m²     Physical Exam:   General appearance - alert, well appearing in no distress  Mental status - alert, oriented to person, place, and time  EYE-PERRL, EOMI  ENT-ENT exam normal, no neck nodes or sinus tenderness  Neck - supple, no significant adenopathy   Chest - clear to auscultation, no wheezes, rales or rhonchi  Heart - normal rate, regular rhythm, no murmurs  Abdomen - soft, nontender, nondistended, no organomegaly  Ext-peripheral pulses normal, no pedal edema  Skin-Warm and dry. no hyperpigmentation or suspicious lesions  Neuro -no focal findings     Results for orders placed or performed in visit on 11/15/22   AMB POC GLUCOSE BLOOD, BY GLUCOSE MONITORING DEVICE   Result Value Ref Range    Glucose  MG/DL     Assessment/Plan:  Diagnoses and all orders for this visit:    Controlled type 2 diabetes mellitus without complication, without long-term current use of insulin (HCC)  -     AMB POC GLUCOSE BLOOD, BY GLUCOSE MONITORING DEVICE  -     semaglutide (Rybelsus) 14 mg tablet; Take 1 Tablet by mouth Daily (before breakfast). Take 14 mg by mouth Daily (before breakfast). , Normal, Disp-90 Tablet, R-1  -     metFORMIN (GLUCOPHAGE) 1,000 mg tablet; Take 1 Tablet by mouth daily. , Normal, Disp-90 Tablet, R-1    Hypertension goal BP (blood pressure) < 130/80  -     valsartan (DIOVAN) 40 mg tablet; Take 1 Tablet by mouth daily. , Normal, Disp-90 Tablet, R-1    Severe obesity (BMI 35.0-39. 9) with comorbidity (HCC)  -     semaglutide (Rybelsus) 14 mg tablet; Take 1 Tablet by mouth Daily (before breakfast). Take 14 mg by mouth Daily (before breakfast). , Normal, Disp-90 Tablet, R-1    Constipation, unspecified constipation type  -     REFERRAL TO GASTROENTEROLOGY    Constipation: Care Instructions  Overview     Constipation means that you have a hard time passing stools (bowel movements). People pass stools from 3 times a day to once every 3 days. What is normal for you may be different. Constipation may occur with pain in the rectum and cramping. The pain may get worse when you try to pass stools. Sometimes there are small amounts of bright red blood on toilet paper or the surface of stools. This is because of enlarged veins near the rectum (hemorrhoids). A few changes in your diet and lifestyle may help you avoid ongoing constipation. Your doctor may also prescribe medicine to help loosen your stool. Some medicines can cause constipation.  These include pain medicines and antidepressants. Tell your doctor about all the medicines you take. Your doctor may want to make a medicine change to ease your symptoms. Follow-up care is a key part of your treatment and safety. Be sure to make and go to all appointments, and call your doctor if you are having problems. It's also a good idea to know your test results and keep a list of the medicines you take. How can you care for yourself at home? Drink plenty of fluids. If you have kidney, heart, or liver disease and have to limit fluids, talk with your doctor before you increase the amount of fluids you drink. Include high-fiber foods in your diet each day. These include fruits, vegetables, beans, and whole grains. Get at least 30 minutes of exercise on most days of the week. Walking is a good choice. You also may want to do other activities, such as running, swimming, cycling, or playing tennis or team sports. Take a fiber supplement, such as Citrucel or Metamucil, every day. Read and follow all instructions on the label. Schedule time each day for a bowel movement. A daily routine may help. Take your time having a bowel movement, but don't sit for more than 10 minutes at a time. And don't strain too much. Support your feet with a small step stool when you sit on the toilet. This helps flex your hips and places your pelvis in a squatting position. Your doctor may recommend an over-the-counter laxative to relieve your constipation. Examples are Milk of Magnesia and MiraLax. Read and follow all instructions on the label. Do not use laxatives on a long-term basis. When should you call for help? Call your doctor now or seek immediate medical care if:    You have new or worse belly pain. You have new or worse nausea or vomiting. You have blood in your stools. Watch closely for changes in your health, and be sure to contact your doctor if:    Your constipation is getting worse.      You do not get better as expected. Where can you learn more? Go to http://www.[employer].com/  Enter P343 in the search box to learn more about \"Constipation: Care Instructions. \"  Current as of: June 6, 2022               Content Version: 13.4  © 5734-4416 [employer]. Care instructions adapted under license by [employer] (which disclaims liability or warranty for this information). If you have questions about a medical condition or this instruction, always ask your healthcare professional. [address] any warranty or liability for your use of this information. Follow-up and Dispositions    Return in about 1 week (around 11/22/2022) for f/u treatment/results.

## 2022-11-15 NOTE — PROGRESS NOTES
Chief Complaint   Patient presents with    Follow-up     Physical exam      1. \"Have you been to the ER, urgent care clinic since your last visit? Hospitalized since your last visit? \" No    2. \"Have you seen or consulted any other health care providers outside of the [address] [place] [address] since your last visit? \" No     3. For patients aged 39-70: Has the patient had a colonoscopy / FIT/ Cologuard? Yes - no Care Gap present      If the patient is female:    4. For patients aged 41-77: Has the patient had a mammogram within the past 2 years? No      5. For patients aged 21-65: Has the patient had a pap smear?  Yes - no Care Gap present

## 2023-05-10 DIAGNOSIS — I10 ESSENTIAL (PRIMARY) HYPERTENSION: ICD-10-CM

## 2023-05-11 RX ORDER — VALSARTAN 40 MG/1
TABLET ORAL
Qty: 90 TABLET | Refills: 1 | Status: SHIPPED | OUTPATIENT
Start: 2023-05-11

## 2023-05-12 ENCOUNTER — TELEPHONE (OUTPATIENT)
Age: 53
End: 2023-05-12

## 2023-06-06 ENCOUNTER — OFFICE VISIT (OUTPATIENT)
Age: 53
End: 2023-06-06
Payer: COMMERCIAL

## 2023-06-06 VITALS
BODY MASS INDEX: 35.79 KG/M2 | WEIGHT: 202 LBS | TEMPERATURE: 97.6 F | OXYGEN SATURATION: 100 % | SYSTOLIC BLOOD PRESSURE: 139 MMHG | HEART RATE: 62 BPM | HEIGHT: 63 IN | RESPIRATION RATE: 20 BRPM | DIASTOLIC BLOOD PRESSURE: 80 MMHG

## 2023-06-06 DIAGNOSIS — E11.9 DIABETIC EYE EXAM (HCC): ICD-10-CM

## 2023-06-06 DIAGNOSIS — Z12.31 ENCOUNTER FOR SCREENING MAMMOGRAM FOR BREAST CANCER: ICD-10-CM

## 2023-06-06 DIAGNOSIS — Z01.00 DIABETIC EYE EXAM (HCC): ICD-10-CM

## 2023-06-06 DIAGNOSIS — Z11.4 ENCOUNTER FOR SCREENING FOR HUMAN IMMUNODEFICIENCY VIRUS (HIV): ICD-10-CM

## 2023-06-06 DIAGNOSIS — E11.65 TYPE 2 DIABETES MELLITUS WITH HYPERGLYCEMIA, WITHOUT LONG-TERM CURRENT USE OF INSULIN (HCC): ICD-10-CM

## 2023-06-06 DIAGNOSIS — Z12.4 PAP SMEAR FOR CERVICAL CANCER SCREENING: ICD-10-CM

## 2023-06-06 DIAGNOSIS — E78.00 HYPERCHOLESTEROLEMIA: ICD-10-CM

## 2023-06-06 DIAGNOSIS — Z12.11 COLON CANCER SCREENING: ICD-10-CM

## 2023-06-06 DIAGNOSIS — Z11.1 SCREENING-PULMONARY TB: ICD-10-CM

## 2023-06-06 DIAGNOSIS — E11.9 TYPE 2 DIABETES MELLITUS WITHOUT COMPLICATION, WITH LONG-TERM CURRENT USE OF INSULIN (HCC): Primary | ICD-10-CM

## 2023-06-06 DIAGNOSIS — E11.9 ENCOUNTER FOR DIABETIC FOOT EXAM (HCC): ICD-10-CM

## 2023-06-06 DIAGNOSIS — Z79.4 TYPE 2 DIABETES MELLITUS WITHOUT COMPLICATION, WITH LONG-TERM CURRENT USE OF INSULIN (HCC): Primary | ICD-10-CM

## 2023-06-06 DIAGNOSIS — E66.01 SEVERE OBESITY (BMI 35.0-39.9) WITH COMORBIDITY (HCC): ICD-10-CM

## 2023-06-06 LAB — HBA1C MFR BLD: 8.8 %

## 2023-06-06 PROCEDURE — 83036 HEMOGLOBIN GLYCOSYLATED A1C: CPT | Performed by: INTERNAL MEDICINE

## 2023-06-06 PROCEDURE — 3074F SYST BP LT 130 MM HG: CPT | Performed by: INTERNAL MEDICINE

## 2023-06-06 PROCEDURE — 3078F DIAST BP <80 MM HG: CPT | Performed by: INTERNAL MEDICINE

## 2023-06-06 PROCEDURE — 99214 OFFICE O/P EST MOD 30 MIN: CPT | Performed by: INTERNAL MEDICINE

## 2023-06-06 RX ORDER — ATORVASTATIN CALCIUM 10 MG/1
10 TABLET, FILM COATED ORAL DAILY
Qty: 90 TABLET | Refills: 1 | Status: SHIPPED | OUTPATIENT
Start: 2023-06-06

## 2023-06-06 RX ORDER — DULAGLUTIDE 0.75 MG/.5ML
0.75 INJECTION, SOLUTION SUBCUTANEOUS WEEKLY
Qty: 12 ADJUSTABLE DOSE PRE-FILLED PEN SYRINGE | Refills: 1 | Status: SHIPPED | OUTPATIENT
Start: 2023-06-06

## 2023-06-06 SDOH — ECONOMIC STABILITY: HOUSING INSECURITY
IN THE LAST 12 MONTHS, WAS THERE A TIME WHEN YOU DID NOT HAVE A STEADY PLACE TO SLEEP OR SLEPT IN A SHELTER (INCLUDING NOW)?: NO

## 2023-06-06 SDOH — ECONOMIC STABILITY: INCOME INSECURITY: IN THE LAST 12 MONTHS, WAS THERE A TIME WHEN YOU WERE NOT ABLE TO PAY THE MORTGAGE OR RENT ON TIME?: NO

## 2023-06-06 SDOH — ECONOMIC STABILITY: FOOD INSECURITY: WITHIN THE PAST 12 MONTHS, THE FOOD YOU BOUGHT JUST DIDN'T LAST AND YOU DIDN'T HAVE MONEY TO GET MORE.: NEVER TRUE

## 2023-06-06 SDOH — ECONOMIC STABILITY: INCOME INSECURITY: HOW HARD IS IT FOR YOU TO PAY FOR THE VERY BASICS LIKE FOOD, HOUSING, MEDICAL CARE, AND HEATING?: NOT HARD AT ALL

## 2023-06-06 SDOH — HEALTH STABILITY: PHYSICAL HEALTH: ON AVERAGE, HOW MANY MINUTES DO YOU ENGAGE IN EXERCISE AT THIS LEVEL?: 10 MIN

## 2023-06-06 SDOH — ECONOMIC STABILITY: HOUSING INSECURITY: IN THE LAST 12 MONTHS, HOW MANY PLACES HAVE YOU LIVED?: 0

## 2023-06-06 SDOH — ECONOMIC STABILITY: TRANSPORTATION INSECURITY
IN THE PAST 12 MONTHS, HAS THE LACK OF TRANSPORTATION KEPT YOU FROM MEDICAL APPOINTMENTS OR FROM GETTING MEDICATIONS?: NO

## 2023-06-06 SDOH — ECONOMIC STABILITY: FOOD INSECURITY: WITHIN THE PAST 12 MONTHS, YOU WORRIED THAT YOUR FOOD WOULD RUN OUT BEFORE YOU GOT MONEY TO BUY MORE.: NEVER TRUE

## 2023-06-06 SDOH — HEALTH STABILITY: PHYSICAL HEALTH: ON AVERAGE, HOW MANY DAYS PER WEEK DO YOU ENGAGE IN MODERATE TO STRENUOUS EXERCISE (LIKE A BRISK WALK)?: 3 DAYS

## 2023-06-06 SDOH — ECONOMIC STABILITY: TRANSPORTATION INSECURITY
IN THE PAST 12 MONTHS, HAS LACK OF TRANSPORTATION KEPT YOU FROM MEETINGS, WORK, OR FROM GETTING THINGS NEEDED FOR DAILY LIVING?: NO

## 2023-06-06 ASSESSMENT — ANXIETY QUESTIONNAIRES
6. BECOMING EASILY ANNOYED OR IRRITABLE: 0
7. FEELING AFRAID AS IF SOMETHING AWFUL MIGHT HAPPEN: 0
5. BEING SO RESTLESS THAT IT IS HARD TO SIT STILL: 0
GAD7 TOTAL SCORE: 0
3. WORRYING TOO MUCH ABOUT DIFFERENT THINGS: 0
2. NOT BEING ABLE TO STOP OR CONTROL WORRYING: 0
1. FEELING NERVOUS, ANXIOUS, OR ON EDGE: 0
4. TROUBLE RELAXING: 0
IF YOU CHECKED OFF ANY PROBLEMS ON THIS QUESTIONNAIRE, HOW DIFFICULT HAVE THESE PROBLEMS MADE IT FOR YOU TO DO YOUR WORK, TAKE CARE OF THINGS AT HOME, OR GET ALONG WITH OTHER PEOPLE: NOT DIFFICULT AT ALL

## 2023-06-06 ASSESSMENT — PATIENT HEALTH QUESTIONNAIRE - PHQ9
SUM OF ALL RESPONSES TO PHQ QUESTIONS 1-9: 0
2. FEELING DOWN, DEPRESSED OR HOPELESS: 0
SUM OF ALL RESPONSES TO PHQ QUESTIONS 1-9: 0
SUM OF ALL RESPONSES TO PHQ9 QUESTIONS 1 & 2: 0
1. LITTLE INTEREST OR PLEASURE IN DOING THINGS: 0
SUM OF ALL RESPONSES TO PHQ QUESTIONS 1-9: 0
SUM OF ALL RESPONSES TO PHQ QUESTIONS 1-9: 0

## 2023-06-06 ASSESSMENT — SOCIAL DETERMINANTS OF HEALTH (SDOH)
WITHIN THE LAST YEAR, HAVE YOU BEEN AFRAID OF YOUR PARTNER OR EX-PARTNER?: NO
WITHIN THE LAST YEAR, HAVE TO BEEN RAPED OR FORCED TO HAVE ANY KIND OF SEXUAL ACTIVITY BY YOUR PARTNER OR EX-PARTNER?: NO
WITHIN THE LAST YEAR, HAVE YOU BEEN KICKED, HIT, SLAPPED, OR OTHERWISE PHYSICALLY HURT BY YOUR PARTNER OR EX-PARTNER?: NO
WITHIN THE LAST YEAR, HAVE YOU BEEN KICKED, HIT, SLAPPED, OR OTHERWISE PHYSICALLY HURT BY YOUR PARTNER OR EX-PARTNER?: NO
WITHIN THE LAST YEAR, HAVE TO BEEN RAPED OR FORCED TO HAVE ANY KIND OF SEXUAL ACTIVITY BY YOUR PARTNER OR EX-PARTNER?: NO
WITHIN THE LAST YEAR, HAVE YOU BEEN AFRAID OF YOUR PARTNER OR EX-PARTNER?: NO
WITHIN THE LAST YEAR, HAVE YOU BEEN HUMILIATED OR EMOTIONALLY ABUSED IN OTHER WAYS BY YOUR PARTNER OR EX-PARTNER?: NO
WITHIN THE LAST YEAR, HAVE YOU BEEN HUMILIATED OR EMOTIONALLY ABUSED IN OTHER WAYS BY YOUR PARTNER OR EX-PARTNER?: NO

## 2023-06-06 ASSESSMENT — LIFESTYLE VARIABLES
HOW OFTEN DO YOU HAVE A DRINK CONTAINING ALCOHOL: NEVER
HOW MANY STANDARD DRINKS CONTAINING ALCOHOL DO YOU HAVE ON A TYPICAL DAY: PATIENT DOES NOT DRINK

## 2023-06-06 NOTE — PROGRESS NOTES
Chief Complaint   Patient presents with    Follow-up     HPI:  Serena Campbell is a 48 y.o. AA female with diabetes, hypertension, hypercholesterolemia present for follow up. .  Blood pressure is at goal. A1C reading is8. 8%. She has c/o constipation. Patient is requesting evaluation to become a  and needs form completion. Review of Systems  As per hpi    Past Medical History:   Diagnosis Date    Diabetes ([address]     Hypercholesterolemia     Hypertension      Past Surgical History:   Procedure Laterality Date    ABLATION OF DYSRHYTHMIC FOCUS  2012    GYN      D&C    ORTHOPEDIC SURGERY      bicipital tendon repair 2016    OTHER SURGICAL HISTORY      tonsils    TONSILLECTOMY      age 27     Social History     Socioeconomic History    Marital status:      Spouse name: None    Number of children: None    Years of education: None    Highest education level: None   Tobacco Use    Smoking status: Never    Smokeless tobacco: Never   Substance and Sexual Activity    Alcohol use: Yes    Drug use: Not Currently     Social Determinants of Health     Financial Resource Strain: Low Risk     Difficulty of Paying Living Expenses: Not hard at all   Food Insecurity: No Food Insecurity    Worried About [facility] in the Last Year: Never true    Ran Out of Food in the Last Year: Never true   Transportation Needs: No Transportation Needs    Lack of Transportation (Medical): No    Lack of Transportation (Non-Medical):  No   Physical Activity: Insufficiently Active    Days of Exercise per Week: 3 days    Minutes of Exercise per Session: 10 min   Stress: No Stress Concern Present    Feeling of Stress : Not at all   Intimate Partner Violence: Not At Risk    Fear of Current or Ex-Partner: No    Emotionally Abused: No    Physically Abused: No    Sexually Abused: No   Housing Stability: Low Risk     Unable to Pay for Housing in the Last Year: No    Number of Places Lived in the Last Year: 0    Unstable Housing in

## 2023-06-07 LAB
ALBUMIN SERPL-MCNC: 4.4 G/DL (ref 3.8–4.9)
ALBUMIN/GLOB SERPL: 1.8 {RATIO} (ref 1.2–2.2)
ALP SERPL-CCNC: 104 IU/L (ref 44–121)
ALT SERPL-CCNC: 12 IU/L (ref 0–32)
AST SERPL-CCNC: 12 IU/L (ref 0–40)
BILIRUB SERPL-MCNC: 0.2 MG/DL (ref 0–1.2)
BUN SERPL-MCNC: 10 MG/DL (ref 6–24)
BUN/CREAT SERPL: 13 (ref 9–23)
CALCIUM SERPL-MCNC: 9.5 MG/DL (ref 8.7–10.2)
CHLORIDE SERPL-SCNC: 103 MMOL/L (ref 96–106)
CHOLEST SERPL-MCNC: 305 MG/DL (ref 100–199)
CO2 SERPL-SCNC: 23 MMOL/L (ref 20–29)
CREAT SERPL-MCNC: 0.77 MG/DL (ref 0.57–1)
EGFRCR SERPLBLD CKD-EPI 2021: 93 ML/MIN/1.73
GLOBULIN SER CALC-MCNC: 2.5 G/DL (ref 1.5–4.5)
GLUCOSE SERPL-MCNC: 195 MG/DL (ref 70–99)
HDLC SERPL-MCNC: 132 MG/DL
HIV 1+2 AB+HIV1 P24 AG SERPL QL IA: NON REACTIVE
LDLC SERPL CALC-MCNC: 165 MG/DL (ref 0–99)
POTASSIUM SERPL-SCNC: 4.2 MMOL/L (ref 3.5–5.2)
PROT SERPL-MCNC: 6.9 G/DL (ref 6–8.5)
SODIUM SERPL-SCNC: 141 MMOL/L (ref 134–144)
TRIGL SERPL-MCNC: 60 MG/DL (ref 0–149)
VLDLC SERPL CALC-MCNC: 8 MG/DL (ref 5–40)

## 2023-06-09 LAB
GAMMA INTERFERON BACKGROUND BLD IA-ACNC: 0 IU/ML
M TB IFN-G BLD-IMP: NEGATIVE
M TB IFN-G CD4+ T-CELLS BLD-ACNC: 0 IU/ML
M TBIFN-G CD4+ CD8+T-CELLS BLD-ACNC: 0.01 IU/ML
MITOGEN IGNF BLD-ACNC: >10 IU/ML
SERVICE CMNT-IMP: NORMAL

## 2023-06-13 DIAGNOSIS — Z11.4 ENCOUNTER FOR SCREENING FOR HUMAN IMMUNODEFICIENCY VIRUS (HIV): ICD-10-CM

## 2023-06-13 DIAGNOSIS — Z79.4 TYPE 2 DIABETES MELLITUS WITHOUT COMPLICATION, WITH LONG-TERM CURRENT USE OF INSULIN (HCC): ICD-10-CM

## 2023-06-13 DIAGNOSIS — E66.01 SEVERE OBESITY (BMI 35.0-39.9) WITH COMORBIDITY (HCC): ICD-10-CM

## 2023-06-13 DIAGNOSIS — E78.00 HYPERCHOLESTEROLEMIA: ICD-10-CM

## 2023-06-13 DIAGNOSIS — E11.9 TYPE 2 DIABETES MELLITUS WITHOUT COMPLICATION, WITH LONG-TERM CURRENT USE OF INSULIN (HCC): ICD-10-CM

## 2023-06-14 ENCOUNTER — TELEPHONE (OUTPATIENT)
Age: 53
End: 2023-06-14

## 2023-06-14 NOTE — TELEPHONE ENCOUNTER
Patient called, stating she left a form last week at her appointment, a health screening form for her work    Has the form been completed    Patient would like to speak to the nurse    Please call  [phone]

## 2023-06-26 ENCOUNTER — TELEPHONE (OUTPATIENT)
Age: 53
End: 2023-06-26

## 2023-06-26 NOTE — TELEPHONE ENCOUNTER
[facility] calling     States they sent a form over to be filled out but it was sent back with out info added         Best number to reach [facility] [phone]

## 2023-07-05 ENCOUNTER — TELEMEDICINE (OUTPATIENT)
Age: 53
End: 2023-07-05
Payer: COMMERCIAL

## 2023-07-05 DIAGNOSIS — E78.00 HYPERCHOLESTEROLEMIA: ICD-10-CM

## 2023-07-05 DIAGNOSIS — E11.9 TYPE 2 DIABETES MELLITUS WITHOUT COMPLICATION, WITH LONG-TERM CURRENT USE OF INSULIN (HCC): Primary | ICD-10-CM

## 2023-07-05 DIAGNOSIS — Z79.4 TYPE 2 DIABETES MELLITUS WITHOUT COMPLICATION, WITH LONG-TERM CURRENT USE OF INSULIN (HCC): Primary | ICD-10-CM

## 2023-07-05 PROCEDURE — 99214 OFFICE O/P EST MOD 30 MIN: CPT | Performed by: INTERNAL MEDICINE

## 2023-07-05 NOTE — PROGRESS NOTES
Chief Complaint   Patient presents with    Diabetes     Follow-up    1. Have you been to the ER, urgent care clinic since your last visit? Hospitalized since your last visit? No    2. Have you seen or consulted any other health care providers outside of the [address] [place] [address] since your last visit? Include any pap smears or colon screening.  No

## 2023-07-05 NOTE — PROGRESS NOTES
2023    TELEHEALTH EVALUATION -- Audio/Visual    HPI:  Patricia Villaseñor (:  1970) has requested an audio/video evaluation for the following concern(s):  Patient is seen for lab review. Tot chol and LDL are elevated. Last A1C is 8.8%. TB Gold screen is negative. Advised to continue current medication.           Resulted Orders   Comprehensive Metabolic Panel   Result Value Ref Range     Glucose 195 (H) 70 - 99 mg/dL     BUN 10 6 - 24 mg/dL     Creatinine 0.77 0.57 - 1.00 mg/dL     Est, Glomerular Filtration Rate 93 >59 mL/min/1.73     BUN/Creatinine Ratio 13 9 - 23     Sodium 141 134 - 144 mmol/L     Potassium 4.2 3.5 - 5.2 mmol/L     Chloride 103 96 - 106 mmol/L     CO2 23 20 - 29 mmol/L     Calcium 9.5 8.7 - 10.2 mg/dL     Total Protein 6.9 6.0 - 8.5 g/dL     Albumin 4.4 3.8 - 4.9 g/dL     Globulin, Total 2.5 1.5 - 4.5 g/dL     Albumin/Globulin Ratio 1.8 1.2 - 2.2     Total Bilirubin 0.2 0.0 - 1.2 mg/dL     Alkaline Phosphatase 104 44 - 121 IU/L     AST 12 0 - 40 IU/L     ALT 12 0 - 32 IU/L     Narrative     Performed at:  [address] [place] [address] [address], [place], [place]  [id number]  : Ivonne Friedman MD, Phone:  [phone]   Lipid Panel   Result Value Ref Range     Cholesterol 305 (H) 100 - 199 mg/dL     Triglycerides 60 0 - 149 mg/dL      >39 mg/dL     VLDL Cholesterol Calculated 8 5 - 40 mg/dL     LDL Calculated 165 (H) 0 - 99 mg/dL     Narrative     Performed at:  [facility] [employer] [facility] [facility]  [address], [place], [place]  [id number]  : Ivonne Friedman MD, Phone:  [phone]   HIV 1/2 Ag/Ab, 4TH Generation,W Rflx Confirm   Result Value Ref Range     HIV Screen 4th Generation wRfx Non Reactive Non Reactive     Narrative     Performed at:  [address] [place] [address] [address], [place], [place]  [id number]  : Ivonne Friedman MD, Phone:  [phone]   Quantiferon, Incubated   Result Value Ref Range     Quantiferon Criteria Comment       QuantiFERON TB1 Ag

## 2023-11-03 DIAGNOSIS — I10 ESSENTIAL (PRIMARY) HYPERTENSION: ICD-10-CM

## 2023-11-06 RX ORDER — VALSARTAN 40 MG/1
40 TABLET ORAL DAILY
Qty: 30 TABLET | Refills: 0 | Status: SHIPPED | OUTPATIENT
Start: 2023-11-06

## 2023-11-14 DIAGNOSIS — Z79.4 TYPE 2 DIABETES MELLITUS WITHOUT COMPLICATION, WITH LONG-TERM CURRENT USE OF INSULIN (HCC): ICD-10-CM

## 2023-11-14 DIAGNOSIS — E11.9 TYPE 2 DIABETES MELLITUS WITHOUT COMPLICATION, WITH LONG-TERM CURRENT USE OF INSULIN (HCC): ICD-10-CM

## 2023-11-15 NOTE — TELEPHONE ENCOUNTER
Last appointment: 7/5/23  Next appointment: Jodi Steelkalyani to follow-up 10/5/23  Previous refill encounter(s): 6/6/23 #12 with 1 refill    Requested Prescriptions     Pending Prescriptions Disp Refills    TRULICITY 6.74 ZP/5.3DC SOPN [Pharmacy Med Name: TRULICITY 3.31 BA/7.0 ML PEN] 6 mL 0     Sig: INJECT 0.75 MG SUBCUTANEOUSLY ONE TIME PER WEEK         For Pharmacy Admin Tracking Only    Program: Medication Refill  CPA in place:    Recommendation Provided To:    Intervention Detail: New Rx: 1, reason: Patient Preference  Intervention Accepted By:   Denny St Closed?:    Time Spent (min): 5

## 2023-11-16 RX ORDER — DULAGLUTIDE 0.75 MG/.5ML
INJECTION, SOLUTION SUBCUTANEOUS
Qty: 6 ML | Refills: 0 | Status: SHIPPED | OUTPATIENT
Start: 2023-11-16

## 2023-11-26 DIAGNOSIS — E78.00 HYPERCHOLESTEROLEMIA: ICD-10-CM

## 2023-11-27 RX ORDER — ATORVASTATIN CALCIUM 10 MG/1
10 TABLET, FILM COATED ORAL DAILY
Qty: 30 TABLET | Refills: 0 | Status: SHIPPED | OUTPATIENT
Start: 2023-11-27

## 2023-11-27 NOTE — TELEPHONE ENCOUNTER
Last appointment: 7/5/23  Next appointment: Barney Jackson to follow-up 10/5/23  Previous refill encounter(s): 6/6/23 #90 with 1 refill    Requested Prescriptions     Pending Prescriptions Disp Refills    atorvastatin (LIPITOR) 10 MG tablet [Pharmacy Med Name: ATORVASTATIN 10 MG TABLET] 30 tablet 0     Sig: TAKE 1 TABLET BY MOUTH [id number] Antolin Kirkpatrick Tracking Only    Program: Medication Refill  CPA in place:    Recommendation Provided To:    Intervention Detail: New Rx: 1, reason: Patient Preference  Intervention Accepted By:   Sarina Crews Closed?:    Time Spent (min): 5

## 2023-12-06 DIAGNOSIS — I10 ESSENTIAL (PRIMARY) HYPERTENSION: ICD-10-CM

## 2023-12-06 RX ORDER — VALSARTAN 40 MG/1
40 TABLET ORAL DAILY
Qty: 15 TABLET | Refills: 0 | Status: SHIPPED | OUTPATIENT
Start: 2023-12-06

## 2023-12-06 NOTE — TELEPHONE ENCOUNTER
Last appointment: 7/5/23  Next appointment: Yohan Knutson to follow-up 10/5/23  Previous refill encounter(s): 11/6/23 #30    Requested Prescriptions     Pending Prescriptions Disp Refills    valsartan (DIOVAN) 40 MG tablet [Pharmacy Med Name: VALSARTAN 40 MG TABLET] 15 tablet 0     Sig: Take 1 tablet by mouth daily Patient needs an appointment for further refills         For Pharmacy Admin Tracking Only    Program: Medication Refill  CPA in place:    Recommendation Provided To:    Intervention Detail: New Rx: 1, reason: Patient Preference and Scheduled Appointment  Intervention Accepted By:   Marcus Moss?:    Time Spent (min): 5

## 2023-12-21 ENCOUNTER — OFFICE VISIT (OUTPATIENT)
Age: 53
End: 2023-12-21
Payer: COMMERCIAL

## 2023-12-21 VITALS
BODY MASS INDEX: 34.91 KG/M2 | DIASTOLIC BLOOD PRESSURE: 58 MMHG | TEMPERATURE: 98.7 F | RESPIRATION RATE: 20 BRPM | SYSTOLIC BLOOD PRESSURE: 133 MMHG | OXYGEN SATURATION: 95 % | HEIGHT: 63 IN | HEART RATE: 76 BPM | WEIGHT: 197 LBS

## 2023-12-21 DIAGNOSIS — S46.912D SHOULDER STRAIN, LEFT, SUBSEQUENT ENCOUNTER: ICD-10-CM

## 2023-12-21 DIAGNOSIS — S09.90XA ACUTE HEADACHE DUE TO TRAUMATIC INJURY OF HEAD: ICD-10-CM

## 2023-12-21 DIAGNOSIS — I10 ESSENTIAL (PRIMARY) HYPERTENSION: ICD-10-CM

## 2023-12-21 DIAGNOSIS — Z79.4 TYPE 2 DIABETES MELLITUS WITHOUT COMPLICATION, WITH LONG-TERM CURRENT USE OF INSULIN (HCC): ICD-10-CM

## 2023-12-21 DIAGNOSIS — E11.65 POORLY CONTROLLED DIABETES MELLITUS (HCC): Primary | ICD-10-CM

## 2023-12-21 DIAGNOSIS — S76.019D HIP STRAIN, SUBSEQUENT ENCOUNTER: ICD-10-CM

## 2023-12-21 DIAGNOSIS — S16.1XXD NECK STRAIN, SUBSEQUENT ENCOUNTER: ICD-10-CM

## 2023-12-21 DIAGNOSIS — E11.9 TYPE 2 DIABETES MELLITUS WITHOUT COMPLICATION, WITH LONG-TERM CURRENT USE OF INSULIN (HCC): ICD-10-CM

## 2023-12-21 DIAGNOSIS — E78.00 HYPERCHOLESTEROLEMIA: ICD-10-CM

## 2023-12-21 LAB — HBA1C MFR BLD: 10.7 %

## 2023-12-21 PROCEDURE — 3075F SYST BP GE 130 - 139MM HG: CPT | Performed by: INTERNAL MEDICINE

## 2023-12-21 PROCEDURE — 83036 HEMOGLOBIN GLYCOSYLATED A1C: CPT | Performed by: INTERNAL MEDICINE

## 2023-12-21 PROCEDURE — 3078F DIAST BP <80 MM HG: CPT | Performed by: INTERNAL MEDICINE

## 2023-12-21 PROCEDURE — 99214 OFFICE O/P EST MOD 30 MIN: CPT | Performed by: INTERNAL MEDICINE

## 2023-12-21 RX ORDER — METHOCARBAMOL 750 MG/1
750 TABLET, FILM COATED ORAL
COMMUNITY
Start: 2023-12-13

## 2023-12-21 RX ORDER — LIDOCAINE 50 MG/G
1 PATCH TOPICAL DAILY
Qty: 60 PATCH | Refills: 0 | Status: SHIPPED | OUTPATIENT
Start: 2023-12-21 | End: 2024-02-19

## 2023-12-21 RX ORDER — LIDOCAINE 50 MG/G
1 PATCH TOPICAL DAILY
COMMUNITY
Start: 2023-12-13

## 2023-12-21 RX ORDER — CYCLOBENZAPRINE HCL 5 MG
5 TABLET ORAL 3 TIMES DAILY PRN
Qty: 30 TABLET | Refills: 0 | Status: SHIPPED | OUTPATIENT
Start: 2023-12-21 | End: 2023-12-31

## 2023-12-21 RX ORDER — BUTALBITAL, ACETAMINOPHEN AND CAFFEINE 300; 40; 50 MG/1; MG/1; MG/1
1 CAPSULE ORAL EVERY 4 HOURS PRN
Qty: 84 CAPSULE | Refills: 0 | Status: SHIPPED | OUTPATIENT
Start: 2023-12-21

## 2023-12-21 RX ORDER — DULAGLUTIDE 1.5 MG/.5ML
1.5 INJECTION, SOLUTION SUBCUTANEOUS WEEKLY
Qty: 4 ADJUSTABLE DOSE PRE-FILLED PEN SYRINGE | Refills: 3 | Status: SHIPPED | OUTPATIENT
Start: 2023-12-21
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
## 2023-12-28 ENCOUNTER — HOSPITAL ENCOUNTER (OUTPATIENT)
Facility: HOSPITAL | Age: 53
Discharge: HOME | End: 2023-12-28
Payer: COMMERCIAL

## 2023-12-28 DIAGNOSIS — E11.9 TYPE 2 DIABETES MELLITUS WITHOUT COMPLICATION, WITH LONG-TERM CURRENT USE OF INSULIN (HCC): ICD-10-CM

## 2023-12-28 DIAGNOSIS — Z79.4 TYPE 2 DIABETES MELLITUS WITHOUT COMPLICATION, WITH LONG-TERM CURRENT USE OF INSULIN (HCC): ICD-10-CM

## 2023-12-28 DIAGNOSIS — E78.00 HYPERCHOLESTEROLEMIA: ICD-10-CM

## 2023-12-28 DIAGNOSIS — I10 ESSENTIAL (PRIMARY) HYPERTENSION: ICD-10-CM

## 2023-12-28 DIAGNOSIS — E11.65 POORLY CONTROLLED DIABETES MELLITUS (HCC): ICD-10-CM

## 2023-12-28 DIAGNOSIS — S76.019D HIP STRAIN, SUBSEQUENT ENCOUNTER: ICD-10-CM

## 2023-12-28 PROCEDURE — 73521 X-RAY EXAM HIPS BI 2 VIEWS: CPT

## 2023-12-29 LAB
ALBUMIN SERPL-MCNC: 4.4 G/DL (ref 3.8–4.9)
ALBUMIN/GLOB SERPL: 1.8 {RATIO} (ref 1.2–2.2)
ALP SERPL-CCNC: 96 IU/L (ref 44–121)
ALT SERPL-CCNC: 14 IU/L (ref 0–32)
AST SERPL-CCNC: 14 IU/L (ref 0–40)
BILIRUB SERPL-MCNC: <0.2 MG/DL (ref 0–1.2)
BUN SERPL-MCNC: 10 MG/DL (ref 6–24)
BUN/CREAT SERPL: 12 (ref 9–23)
CALCIUM SERPL-MCNC: 10.6 MG/DL (ref 8.7–10.2)
CHLORIDE SERPL-SCNC: 103 MMOL/L (ref 96–106)
CHOLEST SERPL-MCNC: 289 MG/DL (ref 100–199)
CO2 SERPL-SCNC: 26 MMOL/L (ref 20–29)
CREAT SERPL-MCNC: 0.85 MG/DL (ref 0.57–1)
EGFRCR SERPLBLD CKD-EPI 2021: 83 ML/MIN/1.73
GLOBULIN SER CALC-MCNC: 2.4 G/DL (ref 1.5–4.5)
GLUCOSE SERPL-MCNC: 141 MG/DL (ref 70–99)
HDLC SERPL-MCNC: 112 MG/DL
LDLC SERPL CALC-MCNC: 162 MG/DL (ref 0–99)
POTASSIUM SERPL-SCNC: 4.5 MMOL/L (ref 3.5–5.2)
PROT SERPL-MCNC: 6.8 G/DL (ref 6–8.5)
SODIUM SERPL-SCNC: 141 MMOL/L (ref 134–144)
TRIGL SERPL-MCNC: 94 MG/DL (ref 0–149)
VLDLC SERPL CALC-MCNC: 15 MG/DL (ref 5–40)

## 2023-12-30 DIAGNOSIS — E78.00 HYPERCHOLESTEROLEMIA: ICD-10-CM

## 2024-01-02 RX ORDER — ATORVASTATIN CALCIUM 10 MG/1
10 TABLET, FILM COATED ORAL DAILY
Qty: 30 TABLET | Refills: 3 | Status: SHIPPED | OUTPATIENT
Start: 2024-01-02

## 2024-01-05 ENCOUNTER — OFFICE VISIT (OUTPATIENT)
Age: 54
End: 2024-01-05
Payer: COMMERCIAL

## 2024-01-05 VITALS
BODY MASS INDEX: 35.44 KG/M2 | DIASTOLIC BLOOD PRESSURE: 77 MMHG | RESPIRATION RATE: 20 BRPM | HEIGHT: 63 IN | SYSTOLIC BLOOD PRESSURE: 123 MMHG | TEMPERATURE: 97.5 F | HEART RATE: 83 BPM | OXYGEN SATURATION: 97 % | WEIGHT: 200 LBS

## 2024-01-05 DIAGNOSIS — E11.65 POORLY CONTROLLED DIABETES MELLITUS (HCC): ICD-10-CM

## 2024-01-05 DIAGNOSIS — E78.00 PURE HYPERCHOLESTEROLEMIA, UNSPECIFIED: ICD-10-CM

## 2024-01-05 DIAGNOSIS — E66.01 SEVERE OBESITY (BMI 35.0-39.9) WITH COMORBIDITY (HCC): ICD-10-CM

## 2024-01-05 DIAGNOSIS — S76.019D HIP STRAIN, SUBSEQUENT ENCOUNTER: Primary | ICD-10-CM

## 2024-01-05 PROBLEM — E55.9 VITAMIN D DEFICIENCY: Status: ACTIVE | Noted: 2023-12-14

## 2024-01-05 PROBLEM — N95.1 MENOPAUSAL SYNDROME: Status: ACTIVE | Noted: 2023-12-14

## 2024-01-05 PROBLEM — B37.31 CANDIDIASIS OF VAGINA: Status: ACTIVE | Noted: 2023-12-14

## 2024-01-05 PROCEDURE — 99214 OFFICE O/P EST MOD 30 MIN: CPT | Performed by: INTERNAL MEDICINE

## 2024-01-05 PROCEDURE — 3074F SYST BP LT 130 MM HG: CPT | Performed by: INTERNAL MEDICINE

## 2024-01-05 PROCEDURE — 3078F DIAST BP <80 MM HG: CPT | Performed by: INTERNAL MEDICINE

## 2024-01-05 ASSESSMENT — PATIENT HEALTH QUESTIONNAIRE - PHQ9
1. LITTLE INTEREST OR PLEASURE IN DOING THINGS: 0
SUM OF ALL RESPONSES TO PHQ QUESTIONS 1-9: 0
SUM OF ALL RESPONSES TO PHQ QUESTIONS 1-9: 0
SUM OF ALL RESPONSES TO PHQ9 QUESTIONS 1 & 2: 0
2. FEELING DOWN, DEPRESSED OR HOPELESS: 0
SUM OF ALL RESPONSES TO PHQ QUESTIONS 1-9: 0
SUM OF ALL RESPONSES TO PHQ QUESTIONS 1-9: 0

## 2024-01-05 NOTE — PROGRESS NOTES
Questionnaire     Feeling of Stress : Not at all   Intimate Partner Violence: Not At Risk (6/6/2023)    Humiliation, Afraid, Rape, and Kick questionnaire     Fear of Current or Ex-Partner: No     Emotionally Abused: No     Physically Abused: No     Sexually Abused: No   Housing Stability: Low Risk  (12/21/2023)    Housing Stability Vital Sign     Unable to Pay for Housing in the Last Year: No     Number of Places Lived in the Last Year: 0     Unstable Housing in the Last Year: No     Family History   Problem Relation Age of Onset    Cancer Father     Diabetes Mother     Cancer Mother      Current Outpatient Medications   Medication Sig Dispense Refill    atorvastatin (LIPITOR) 10 MG tablet TAKE 1 TABLET BY MOUTH EVERY DAY 30 tablet 3    lidocaine (LIDODERM) 5 % Apply 1 patch topically daily      methocarbamol (ROBAXIN) 750 MG tablet 1 tablet      lidocaine (LIDODERM) 5 % Place 1 patch onto the skin daily 12 hours on, 12 hours off. 60 patch 0    butalbital-APAP-caffeine (FIORICET) -40 MG CAPS per capsule Take 1 capsule by mouth every 4 hours as needed for Headaches 84 capsule 0    dulaglutide (TRULICITY) 1.5 MG/0.5ML SC injection Inject 0.5 mLs into the skin once a week 4 Adjustable Dose Pre-filled Pen Syringe 3    valsartan (DIOVAN) 40 MG tablet Take 1 tablet by mouth daily Patient needs an appointment for further refills 15 tablet 0    metFORMIN (GLUCOPHAGE) 1000 MG tablet Take 1 tablet by mouth daily 180 tablet 1     No current facility-administered medications for this visit.     Allergies   Allergen Reactions    Metronidazole Hives    Penicillin G Hives    Tuberculin Ppd Other (See Comments)     Contraindication  ( positive test )     Objective:  /77   Pulse 83   Temp 97.5 °F (36.4 °C) (Temporal)   Resp 20   Ht 1.6 m (5' 3\")   Wt 90.7 kg (200 lb)   SpO2 97%   BMI 35.43 kg/m²   Physical Exam:   General appearance - alert, well appearing in no distress  Mental status - alert, oriented to person,  4 = No assist / stand by assistance

## 2024-02-21 ENCOUNTER — TELEMEDICINE (OUTPATIENT)
Age: 54
End: 2024-02-21
Payer: COMMERCIAL

## 2024-02-21 DIAGNOSIS — S76.019D HIP STRAIN, SUBSEQUENT ENCOUNTER: ICD-10-CM

## 2024-02-21 DIAGNOSIS — E11.65 POORLY CONTROLLED DIABETES MELLITUS (HCC): Primary | ICD-10-CM

## 2024-02-21 PROCEDURE — 99214 OFFICE O/P EST MOD 30 MIN: CPT | Performed by: INTERNAL MEDICINE

## 2024-02-21 RX ORDER — GLIPIZIDE 10 MG/1
10 TABLET, FILM COATED, EXTENDED RELEASE ORAL DAILY
Qty: 30 TABLET | Refills: 3 | Status: SHIPPED | OUTPATIENT
Start: 2024-02-21

## 2024-02-21 ASSESSMENT — PATIENT HEALTH QUESTIONNAIRE - PHQ9
SUM OF ALL RESPONSES TO PHQ QUESTIONS 1-9: 0
SUM OF ALL RESPONSES TO PHQ9 QUESTIONS 1 & 2: 0
1. LITTLE INTEREST OR PLEASURE IN DOING THINGS: 0
2. FEELING DOWN, DEPRESSED OR HOPELESS: 0

## 2024-02-21 NOTE — PROGRESS NOTES
Carolyn Donaldson, was evaluated through a synchronous (real-time) audio-video encounter. The patient (or guardian if applicable) is aware that this is a billable service, which includes applicable co-pays. This Virtual Visit was conducted with patient's (and/or legal guardian's) consent. Patient identification was verified, and a caregiver was present when appropriate.   The patient was located at Home: [address] [facility] [address]  Provider was located at Facility (Appt Dept): [address] [address]   Yes    Carolyn Donaldson (:  1970) is a Established patient, presenting virtually for evaluation of the following:    Assessment & Plan   Below is the assessment and plan developed based on review of pertinent history, physical exam, labs, studies, and medications.  1. Poorly controlled diabetes mellitus (HCC)  -     glipiZIDE (GLUCOTROL XL) 10 MG extended release tablet; Take 1 tablet by mouth daily, Disp-30 tablet, R-3Normal  -     Dulaglutide 0.75 MG/0.5ML SOPN; Inject 0.75 mg into the skin once a week, Disp-4 Adjustable Dose Pre-filled Pen Syringe, R-3Normal  2. Hip strain, subsequent encounter    Return in about 3 months (around 2024) for diabetes, blood pressure.       Subjective   Carolyn Donaldson is a 54 y.o. AA female is seen for follow up. Trulicity 1.5 mg has been on back order.   Patient has therefore been taking metformin, Last A1C was 10%. Advised addition of glipizide.  Also, patient is still in physical therapy for hip pain. She will be re-evaluated tomorrow.    Review of Systems   As per hpi    Objective   Patient-Reported Vitals  No data recorded     Physical Exam  [INSTRUCTIONS:  \"[x]\" Indicates a positive item  \"[]\" Indicates a negative item  -- DELETE ALL ITEMS NOT EXAMINED]    Constitutional: [x] Appears well-developed and well-nourished [x] No apparent distress      [] Abnormal -     Mental status: [x] Alert and awake  [x] Oriented to

## 2024-02-28 DIAGNOSIS — Z12.31 ENCOUNTER FOR SCREENING MAMMOGRAM FOR BREAST CANCER: ICD-10-CM

## 2024-04-08 ENCOUNTER — OFFICE VISIT (OUTPATIENT)
Age: 54
End: 2024-04-08
Payer: COMMERCIAL

## 2024-04-08 DIAGNOSIS — S76.019D HIP STRAIN, SUBSEQUENT ENCOUNTER: ICD-10-CM

## 2024-04-08 DIAGNOSIS — Z79.4 TYPE 2 DIABETES MELLITUS WITHOUT COMPLICATION, WITH LONG-TERM CURRENT USE OF INSULIN (HCC): ICD-10-CM

## 2024-04-08 DIAGNOSIS — E11.65 TYPE 2 DIABETES MELLITUS WITH HYPERGLYCEMIA, WITHOUT LONG-TERM CURRENT USE OF INSULIN (HCC): Primary | ICD-10-CM

## 2024-04-08 DIAGNOSIS — S09.90XA ACUTE HEADACHE DUE TO TRAUMATIC INJURY OF HEAD: ICD-10-CM

## 2024-04-08 DIAGNOSIS — M54.32 BILATERAL SCIATICA: ICD-10-CM

## 2024-04-08 DIAGNOSIS — S16.1XXD NECK STRAIN, SUBSEQUENT ENCOUNTER: ICD-10-CM

## 2024-04-08 DIAGNOSIS — E78.00 HYPERCHOLESTEROLEMIA: ICD-10-CM

## 2024-04-08 DIAGNOSIS — I10 ESSENTIAL (PRIMARY) HYPERTENSION: ICD-10-CM

## 2024-04-08 DIAGNOSIS — E11.9 TYPE 2 DIABETES MELLITUS WITHOUT COMPLICATION, WITH LONG-TERM CURRENT USE OF INSULIN (HCC): ICD-10-CM

## 2024-04-08 DIAGNOSIS — E11.65 POORLY CONTROLLED DIABETES MELLITUS (HCC): ICD-10-CM

## 2024-04-08 DIAGNOSIS — M54.31 BILATERAL SCIATICA: ICD-10-CM

## 2024-04-08 DIAGNOSIS — E66.01 SEVERE OBESITY (BMI 35.0-39.9) WITH COMORBIDITY (HCC): ICD-10-CM

## 2024-04-08 DIAGNOSIS — S46.912D SHOULDER STRAIN, LEFT, SUBSEQUENT ENCOUNTER: ICD-10-CM

## 2024-04-08 LAB
GLUCOSE, POC: 218 MG/DL
HBA1C MFR BLD: 7.3 %

## 2024-04-08 PROCEDURE — 99214 OFFICE O/P EST MOD 30 MIN: CPT | Performed by: INTERNAL MEDICINE

## 2024-04-08 PROCEDURE — 3074F SYST BP LT 130 MM HG: CPT | Performed by: INTERNAL MEDICINE

## 2024-04-08 PROCEDURE — 3078F DIAST BP <80 MM HG: CPT | Performed by: INTERNAL MEDICINE

## 2024-04-08 PROCEDURE — 83036 HEMOGLOBIN GLYCOSYLATED A1C: CPT | Performed by: INTERNAL MEDICINE

## 2024-04-08 PROCEDURE — 82962 GLUCOSE BLOOD TEST: CPT | Performed by: INTERNAL MEDICINE

## 2024-04-08 RX ORDER — GLIPIZIDE 10 MG/1
10 TABLET, FILM COATED, EXTENDED RELEASE ORAL DAILY
Qty: 30 TABLET | Refills: 3 | Status: SHIPPED | OUTPATIENT
Start: 2024-04-08

## 2024-04-08 RX ORDER — ATORVASTATIN CALCIUM 10 MG/1
10 TABLET, FILM COATED ORAL DAILY
Qty: 30 TABLET | Refills: 3 | Status: SHIPPED | OUTPATIENT
Start: 2024-04-08

## 2024-04-08 RX ORDER — BUTALBITAL, ACETAMINOPHEN AND CAFFEINE 300; 40; 50 MG/1; MG/1; MG/1
1 CAPSULE ORAL EVERY 4 HOURS PRN
Qty: 84 CAPSULE | Refills: 0 | Status: SHIPPED | OUTPATIENT
Start: 2024-04-08

## 2024-04-08 RX ORDER — LIDOCAINE 50 MG/G
1 PATCH TOPICAL EVERY 24 HOURS
Qty: 30 PATCH | Refills: 1 | Status: SHIPPED | OUTPATIENT
Start: 2024-04-08

## 2024-04-08 RX ORDER — METHOCARBAMOL 750 MG/1
750 TABLET, FILM COATED ORAL 3 TIMES DAILY
Qty: 30 TABLET | Refills: 1 | Status: SHIPPED | OUTPATIENT
Start: 2024-04-08

## 2024-04-08 RX ORDER — VALSARTAN 40 MG/1
40 TABLET ORAL DAILY
Qty: 15 TABLET | Refills: 0 | Status: SHIPPED | OUTPATIENT
Start: 2024-04-08

## 2024-04-08 NOTE — PROGRESS NOTES
Violence: Not At Risk (6/6/2023)    Humiliation, Afraid, Rape, and Kick questionnaire     Fear of Current or Ex-Partner: No     Emotionally Abused: No     Physically Abused: No     Sexually Abused: No   Housing Stability: Low Risk  (12/21/2023)    Housing Stability Vital Sign     Unable to Pay for Housing in the Last Year: No     Number of Places Lived in the Last Year: 0     Unstable Housing in the Last Year: No     Family History   Problem Relation Age of Onset    Cancer Father     Diabetes Mother     Cancer Mother      Current Outpatient Medications   Medication Sig Dispense Refill    glipiZIDE (GLUCOTROL XL) 10 MG extended release tablet Take 1 tablet by mouth daily 30 tablet 3    Dulaglutide 0.75 MG/0.5ML SOPN Inject 0.75 mg into the skin once a week 4 Adjustable Dose Pre-filled Pen Syringe 3    atorvastatin (LIPITOR) 10 MG tablet TAKE 1 TABLET BY MOUTH EVERY DAY 30 tablet 3    lidocaine (LIDODERM) 5 % Apply 1 patch topically daily      methocarbamol (ROBAXIN) 750 MG tablet 1 tablet      valsartan (DIOVAN) 40 MG tablet Take 1 tablet by mouth daily Patient needs an appointment for further refills 15 tablet 0    metFORMIN (GLUCOPHAGE) 1000 MG tablet Take 1 tablet by mouth daily 180 tablet 1    butalbital-APAP-caffeine (FIORICET) -40 MG CAPS per capsule Take 1 capsule by mouth every 4 hours as needed for Headaches (Patient not taking: Reported on 4/8/2024) 84 capsule 0     No current facility-administered medications for this visit.     Allergies   Allergen Reactions    Metronidazole Hives    Penicillin G Hives    Tuberculin Ppd Other (See Comments)     Contraindication  ( positive test )     Objective:  BP (!) 125/65   Pulse 77   Temp 97.6 °F (36.4 °C) (Temporal)   Resp 20   Ht 1.6 m (5' 3\")   Wt 95.3 kg (210 lb)   SpO2 100%   BMI 37.20 kg/m²   Physical Exam:   General appearance - alert, well appearing in no distress  Mental status - alert, oriented to person, place, and time  Neck - supple, no

## 2024-04-09 VITALS
BODY MASS INDEX: 37.21 KG/M2 | RESPIRATION RATE: 20 BRPM | OXYGEN SATURATION: 100 % | DIASTOLIC BLOOD PRESSURE: 65 MMHG | TEMPERATURE: 97.6 F | SYSTOLIC BLOOD PRESSURE: 125 MMHG | HEART RATE: 77 BPM | WEIGHT: 210 LBS | HEIGHT: 63 IN

## 2024-06-05 ENCOUNTER — TELEPHONE (OUTPATIENT)
Age: 54
End: 2024-06-05

## 2024-06-05 NOTE — TELEPHONE ENCOUNTER
----- Message from Arcelia Whelan sent at 5/31/2024  1:28 PM EDT -----  Subject: Message to Provider    QUESTIONS  Information for Provider? Patient had to r/s appt and now has been put way   down into August for a F/u appt. Please call patient if a cancelation   becomes available. Thank you   ---------------------------------------------------------------------------  --------------  CALL BACK INFO  [phone]; OK to leave message on voicemail  ---------------------------------------------------------------------------  --------------  SCRIPT ANSWERS  Relationship to Patient? Self

## 2024-06-28 ENCOUNTER — TELEMEDICINE (OUTPATIENT)
Age: 54
End: 2024-06-28
Payer: COMMERCIAL

## 2024-06-28 DIAGNOSIS — E55.9 VITAMIN D DEFICIENCY: ICD-10-CM

## 2024-06-28 DIAGNOSIS — Z13.29 SCREENING FOR THYROID DISORDER: ICD-10-CM

## 2024-06-28 DIAGNOSIS — E78.00 HYPERCHOLESTEROLEMIA: ICD-10-CM

## 2024-06-28 DIAGNOSIS — I10 ESSENTIAL (PRIMARY) HYPERTENSION: ICD-10-CM

## 2024-06-28 DIAGNOSIS — E11.65 TYPE 2 DIABETES MELLITUS WITH HYPERGLYCEMIA, WITHOUT LONG-TERM CURRENT USE OF INSULIN (HCC): Primary | ICD-10-CM

## 2024-06-28 DIAGNOSIS — R35.0 FREQUENCY OF URINATION: ICD-10-CM

## 2024-06-28 DIAGNOSIS — E66.01 CLASS 2 SEVERE OBESITY WITH SERIOUS COMORBIDITY AND BODY MASS INDEX (BMI) OF 37.0 TO 37.9 IN ADULT, UNSPECIFIED OBESITY TYPE (HCC): ICD-10-CM

## 2024-06-28 PROCEDURE — 99214 OFFICE O/P EST MOD 30 MIN: CPT | Performed by: INTERNAL MEDICINE

## 2024-06-28 RX ORDER — ERGOCALCIFEROL 1.25 MG/1
50000 CAPSULE ORAL WEEKLY
COMMUNITY
Start: 2023-12-15

## 2024-06-28 NOTE — PROGRESS NOTES
Carolyn Donaldson (:  1970) is a 54 y.o. female,Established patient, here for evaluation of the following chief complaint(s):  Weight Management (Want to try something different / Radio Frequency body sculpture )      Assessment & Plan   1. Type 2 diabetes mellitus with hyperglycemia, without long-term current use of insulin (HCC)  -     Comprehensive Metabolic Panel; Future  -     CBC with Auto Differential; Future  -     Hemoglobin A1C; Future  -     Microalbumin / Creatinine Urine Ratio; Future  2. Hypercholesterolemia  -     Lipid Panel; Future  3. Essential (primary) hypertension  -     Comprehensive Metabolic Panel; Future  -     CBC with Auto Differential; Future  4. Vitamin D deficiency  -     Vitamin D 25 Hydroxy; Future  5. Screening for thyroid disorder  -     TSH; Future  6. Frequency of urination  -     Urinalysis with Reflex to Culture; Future  7. Class 2 severe obesity with serious comorbidity and body mass index (BMI) of 37.0 to 37.9 in adult, unspecified obesity type (HCC)  -     Comprehensive Metabolic Panel; Future  -     CBC with Auto Differential; Future  -     Lipid Panel; Future     Return keep upcoming appointment.    Subjective   Carolyn Donaldson is a 54 y.o. female is seen for follow up      Review of Systems   As per [facility]    Objective   Physical Exam       On this date 2024 I have spent 15 minutes reviewing previous notes, test results and face to face with the patient discussing the diagnosis and importance of compliance with the treatment plan as well as documenting on the day of the visit.      An electronic signature was used to authenticate this note.    --Kwaku Paez MD

## 2024-07-05 DIAGNOSIS — E66.01 CLASS 2 SEVERE OBESITY WITH SERIOUS COMORBIDITY AND BODY MASS INDEX (BMI) OF 37.0 TO 37.9 IN ADULT, UNSPECIFIED OBESITY TYPE (HCC): ICD-10-CM

## 2024-07-05 DIAGNOSIS — E55.9 VITAMIN D DEFICIENCY: ICD-10-CM

## 2024-07-05 DIAGNOSIS — E78.00 HYPERCHOLESTEROLEMIA: ICD-10-CM

## 2024-07-05 DIAGNOSIS — Z13.29 SCREENING FOR THYROID DISORDER: ICD-10-CM

## 2024-07-05 DIAGNOSIS — E11.65 TYPE 2 DIABETES MELLITUS WITH HYPERGLYCEMIA, WITHOUT LONG-TERM CURRENT USE OF INSULIN (HCC): ICD-10-CM

## 2024-07-05 DIAGNOSIS — I10 ESSENTIAL (PRIMARY) HYPERTENSION: ICD-10-CM

## 2024-07-09 DIAGNOSIS — S09.90XA ACUTE HEADACHE DUE TO TRAUMATIC INJURY OF HEAD: ICD-10-CM

## 2024-07-09 RX ORDER — BUTALBITAL, ACETAMINOPHEN AND CAFFEINE 300; 40; 50 MG/1; MG/1; MG/1
1 CAPSULE ORAL EVERY 4 HOURS PRN
Qty: 84 CAPSULE | Refills: 0 | OUTPATIENT
Start: 2024-07-09

## 2024-07-09 RX ORDER — BUTALBITAL, ACETAMINOPHEN AND CAFFEINE 300; 40; 50 MG/1; MG/1; MG/1
1 CAPSULE ORAL EVERY 4 HOURS PRN
Qty: 84 CAPSULE | Refills: 0 | Status: SHIPPED | OUTPATIENT
Start: 2024-07-09

## 2024-07-11 LAB
25(OH)D3+25(OH)D2 SERPL-MCNC: 44.8 NG/ML (ref 30–100)
ALBUMIN SERPL-MCNC: 4.6 G/DL (ref 3.8–4.9)
ALP SERPL-CCNC: 104 IU/L (ref 44–121)
ALT SERPL-CCNC: 14 IU/L (ref 0–32)
AST SERPL-CCNC: 15 IU/L (ref 0–40)
BASOPHILS # BLD AUTO: 0 X10E3/UL (ref 0–0.2)
BASOPHILS NFR BLD AUTO: 0 %
BILIRUB SERPL-MCNC: <0.2 MG/DL (ref 0–1.2)
BUN SERPL-MCNC: 14 MG/DL (ref 6–24)
BUN/CREAT SERPL: 15 (ref 9–23)
CALCIUM SERPL-MCNC: 10.2 MG/DL (ref 8.7–10.2)
CHLORIDE SERPL-SCNC: 98 MMOL/L (ref 96–106)
CHOLEST SERPL-MCNC: 258 MG/DL (ref 100–199)
CO2 SERPL-SCNC: 24 MMOL/L (ref 20–29)
CREAT SERPL-MCNC: 0.93 MG/DL (ref 0.57–1)
EGFRCR SERPLBLD CKD-EPI 2021: 74 ML/MIN/1.73
EOSINOPHIL # BLD AUTO: 0.1 X10E3/UL (ref 0–0.4)
EOSINOPHIL NFR BLD AUTO: 1 %
ERYTHROCYTE [DISTWIDTH] IN BLOOD BY AUTOMATED COUNT: 13 % (ref 11.7–15.4)
GLOBULIN SER CALC-MCNC: 2.8 G/DL (ref 1.5–4.5)
GLUCOSE SERPL-MCNC: 132 MG/DL (ref 70–99)
HBA1C MFR BLD: 7.2 % (ref 4.8–5.6)
HCT VFR BLD AUTO: 39.6 % (ref 34–46.6)
HDLC SERPL-MCNC: 124 MG/DL
HGB BLD-MCNC: 12.4 G/DL (ref 11.1–15.9)
IMM GRANULOCYTES # BLD AUTO: 0 X10E3/UL (ref 0–0.1)
IMM GRANULOCYTES NFR BLD AUTO: 1 %
LDLC SERPL CALC-MCNC: 124 MG/DL (ref 0–99)
LYMPHOCYTES # BLD AUTO: 3.1 X10E3/UL (ref 0.7–3.1)
LYMPHOCYTES NFR BLD AUTO: 46 %
MCH RBC QN AUTO: 26.2 PG (ref 26.6–33)
MCHC RBC AUTO-ENTMCNC: 31.3 G/DL (ref 31.5–35.7)
MCV RBC AUTO: 84 FL (ref 79–97)
MONOCYTES # BLD AUTO: 0.5 X10E3/UL (ref 0.1–0.9)
MONOCYTES NFR BLD AUTO: 8 %
NEUTROPHILS # BLD AUTO: 3 X10E3/UL (ref 1.4–7)
NEUTROPHILS NFR BLD AUTO: 44 %
PLATELET # BLD AUTO: 333 X10E3/UL (ref 150–450)
POTASSIUM SERPL-SCNC: 4.7 MMOL/L (ref 3.5–5.2)
PROT SERPL-MCNC: 7.4 G/DL (ref 6–8.5)
RBC # BLD AUTO: 4.73 X10E6/UL (ref 3.77–5.28)
SODIUM SERPL-SCNC: 138 MMOL/L (ref 134–144)
TRIGL SERPL-MCNC: 64 MG/DL (ref 0–149)
TSH SERPL DL<=0.005 MIU/L-ACNC: 1.97 UIU/ML (ref 0.45–4.5)
VLDLC SERPL CALC-MCNC: 10 MG/DL (ref 5–40)
WBC # BLD AUTO: 6.8 X10E3/UL (ref 3.4–10.8)

## 2024-08-05 ENCOUNTER — OFFICE VISIT (OUTPATIENT)
Age: 54
End: 2024-08-05
Payer: COMMERCIAL

## 2024-08-05 VITALS
RESPIRATION RATE: 20 BRPM | SYSTOLIC BLOOD PRESSURE: 138 MMHG | TEMPERATURE: 97.3 F | HEIGHT: 63 IN | OXYGEN SATURATION: 100 % | BODY MASS INDEX: 37.92 KG/M2 | WEIGHT: 214 LBS | DIASTOLIC BLOOD PRESSURE: 62 MMHG | HEART RATE: 78 BPM

## 2024-08-05 DIAGNOSIS — S90.812A ABRASION OF LEFT FOOT, INITIAL ENCOUNTER: ICD-10-CM

## 2024-08-05 DIAGNOSIS — S16.1XXD NECK STRAIN, SUBSEQUENT ENCOUNTER: ICD-10-CM

## 2024-08-05 DIAGNOSIS — S76.019D HIP STRAIN, SUBSEQUENT ENCOUNTER: ICD-10-CM

## 2024-08-05 DIAGNOSIS — Z01.00 DIABETIC EYE EXAM (HCC): ICD-10-CM

## 2024-08-05 DIAGNOSIS — Z76.89 ENCOUNTER FOR WEIGHT MANAGEMENT: ICD-10-CM

## 2024-08-05 DIAGNOSIS — S46.912D SHOULDER STRAIN, LEFT, SUBSEQUENT ENCOUNTER: ICD-10-CM

## 2024-08-05 DIAGNOSIS — E78.5 DYSLIPIDEMIA (HIGH LDL; LOW HDL): ICD-10-CM

## 2024-08-05 DIAGNOSIS — E11.9 ENCOUNTER FOR DIABETIC FOOT EXAM (HCC): ICD-10-CM

## 2024-08-05 DIAGNOSIS — Z79.4 TYPE 2 DIABETES MELLITUS WITHOUT COMPLICATION, WITH LONG-TERM CURRENT USE OF INSULIN (HCC): Primary | ICD-10-CM

## 2024-08-05 DIAGNOSIS — E11.9 DIABETIC EYE EXAM (HCC): ICD-10-CM

## 2024-08-05 DIAGNOSIS — I10 ESSENTIAL (PRIMARY) HYPERTENSION: ICD-10-CM

## 2024-08-05 DIAGNOSIS — E11.9 TYPE 2 DIABETES MELLITUS WITHOUT COMPLICATION, WITH LONG-TERM CURRENT USE OF INSULIN (HCC): Primary | ICD-10-CM

## 2024-08-05 DIAGNOSIS — E55.9 VITAMIN D DEFICIENCY: ICD-10-CM

## 2024-08-05 DIAGNOSIS — E78.00 HYPERCHOLESTEROLEMIA: ICD-10-CM

## 2024-08-05 LAB — GLUCOSE, POC: 137 MG/DL

## 2024-08-05 PROCEDURE — 3075F SYST BP GE 130 - 139MM HG: CPT | Performed by: INTERNAL MEDICINE

## 2024-08-05 PROCEDURE — 82962 GLUCOSE BLOOD TEST: CPT | Performed by: INTERNAL MEDICINE

## 2024-08-05 PROCEDURE — 99214 OFFICE O/P EST MOD 30 MIN: CPT | Performed by: INTERNAL MEDICINE

## 2024-08-05 PROCEDURE — 3051F HG A1C>EQUAL 7.0%<8.0%: CPT | Performed by: INTERNAL MEDICINE

## 2024-08-05 PROCEDURE — 3078F DIAST BP <80 MM HG: CPT | Performed by: INTERNAL MEDICINE

## 2024-08-05 RX ORDER — PHENTERMINE HYDROCHLORIDE 8 MG/1
1 TABLET ORAL DAILY
Qty: 90 TABLET | Refills: 0 | Status: SHIPPED | OUTPATIENT
Start: 2024-08-05 | End: 2024-09-04

## 2024-08-05 RX ORDER — ERGOCALCIFEROL 1.25 MG/1
50000 CAPSULE ORAL WEEKLY
Qty: 5 CAPSULE | Refills: 0 | Status: SHIPPED | OUTPATIENT
Start: 2024-08-05

## 2024-08-05 RX ORDER — PHENTERMINE HYDROCHLORIDE 8 MG/1
1 TABLET ORAL DAILY
Qty: 90 TABLET | Refills: 0 | Status: SHIPPED | OUTPATIENT
Start: 2024-08-05 | End: 2024-08-05 | Stop reason: SDUPTHER

## 2024-08-05 RX ORDER — METHOCARBAMOL 750 MG/1
750 TABLET, FILM COATED ORAL 3 TIMES DAILY
Qty: 30 TABLET | Refills: 1 | Status: SHIPPED | OUTPATIENT
Start: 2024-08-05

## 2024-08-05 RX ORDER — CEPHALEXIN 500 MG/1
500 CAPSULE ORAL 2 TIMES DAILY
Qty: 14 CAPSULE | Refills: 0 | Status: SHIPPED | OUTPATIENT
Start: 2024-08-05

## 2024-08-05 RX ORDER — DULAGLUTIDE 1.5 MG/.5ML
1.5 INJECTION, SOLUTION SUBCUTANEOUS WEEKLY
Qty: 12 ML | Refills: 1 | Status: SHIPPED | OUTPATIENT
Start: 2024-08-05

## 2024-08-05 ASSESSMENT — PATIENT HEALTH QUESTIONNAIRE - PHQ9
SUM OF ALL RESPONSES TO PHQ QUESTIONS 1-9: 0
1. LITTLE INTEREST OR PLEASURE IN DOING THINGS: NOT AT ALL
SUM OF ALL RESPONSES TO PHQ QUESTIONS 1-9: 0
SUM OF ALL RESPONSES TO PHQ9 QUESTIONS 1 & 2: 0
SUM OF ALL RESPONSES TO PHQ QUESTIONS 1-9: 0
2. FEELING DOWN, DEPRESSED OR HOPELESS: NOT AT ALL
SUM OF ALL RESPONSES TO PHQ QUESTIONS 1-9: 0

## 2024-08-05 ASSESSMENT — ANXIETY QUESTIONNAIRES
6. BECOMING EASILY ANNOYED OR IRRITABLE: NOT AT ALL
1. FEELING NERVOUS, ANXIOUS, OR ON EDGE: NOT AT ALL
7. FEELING AFRAID AS IF SOMETHING AWFUL MIGHT HAPPEN: NOT AT ALL
2. NOT BEING ABLE TO STOP OR CONTROL WORRYING: NOT AT ALL
3. WORRYING TOO MUCH ABOUT DIFFERENT THINGS: NOT AT ALL
GAD7 TOTAL SCORE: 0
5. BEING SO RESTLESS THAT IT IS HARD TO SIT STILL: NOT AT ALL
IF YOU CHECKED OFF ANY PROBLEMS ON THIS QUESTIONNAIRE, HOW DIFFICULT HAVE THESE PROBLEMS MADE IT FOR YOU TO DO YOUR WORK, TAKE CARE OF THINGS AT HOME, OR GET ALONG WITH OTHER PEOPLE: NOT DIFFICULT AT ALL
4. TROUBLE RELAXING: NOT AT ALL

## 2024-08-05 NOTE — PROGRESS NOTES
Chief Complaint   Patient presents with    Follow-up     Results       HPI:  Carolyn Donaldson is a 54 y.o. female presents to follow-up for result review.  A1C shows better diabetes control compared to last. Advised to continue current regimen.  Total chol and LDL chol are still elevated ; continue atorvastatin. Diet and exercise are encouraged.  Patient is requesting medication refills.    Review of Systems  As per hpi    Past Medical History:   Diagnosis Date    Diabetes (HCC)     Hypercholesterolemia     Hypertension      Past Surgical History:   Procedure Laterality Date    ABLATION OF DYSRHYTHMIC FOCUS  2012    GYN      D&C    ORTHOPEDIC SURGERY      bicipital tendon repair 2016    OTHER SURGICAL HISTORY      tonsils    TONSILLECTOMY      age 20     Social History     Socioeconomic History    Marital status:      Spouse name: None    Number of children: None    Years of education: None    Highest education level: None   Tobacco Use    Smoking status: Never    Smokeless tobacco: Never   Vaping Use    Vaping Use: Never used   Substance and Sexual Activity    Alcohol use: Not Currently    Drug use: Not Currently    Sexual activity: Yes     Partners: Male     Birth control/protection: Surgical     Social Determinants of Health     Financial Resource Strain: Low Risk  (12/21/2023)    Overall Financial Resource Strain (CARDIA)     Difficulty of Paying Living Expenses: Not hard at all   Food Insecurity: No Food Insecurity (12/21/2023)    Hunger Vital Sign     Worried About Running Out of Food in the Last Year: Never true     Ran Out of Food in the Last Year: Never true   Transportation Needs: Unknown (12/21/2023)    PRAPARE - Transportation     Lack of Transportation (Non-Medical): No   Physical Activity: Insufficiently Active (6/6/2023)    Exercise Vital Sign     Days of Exercise per Week: 3 days     Minutes of Exercise per Session: 10 min   Stress: No Stress Concern Present (6/6/2023)    [facility] of

## 2024-08-12 DIAGNOSIS — E11.65 POORLY CONTROLLED DIABETES MELLITUS (HCC): ICD-10-CM

## 2024-08-12 RX ORDER — GLIPIZIDE 10 MG/1
10 TABLET, FILM COATED, EXTENDED RELEASE ORAL DAILY
Qty: 90 TABLET | Refills: 1 | Status: SHIPPED | OUTPATIENT
Start: 2024-08-12

## 2024-08-25 DIAGNOSIS — I10 ESSENTIAL (PRIMARY) HYPERTENSION: ICD-10-CM

## 2024-08-26 RX ORDER — VALSARTAN 40 MG/1
40 TABLET ORAL DAILY
Qty: 30 TABLET | Refills: 0 | Status: SHIPPED | OUTPATIENT
Start: 2024-08-26

## 2024-09-11 ENCOUNTER — OFFICE VISIT (OUTPATIENT)
Age: 54
End: 2024-09-11
Payer: COMMERCIAL

## 2024-09-11 VITALS
DIASTOLIC BLOOD PRESSURE: 87 MMHG | SYSTOLIC BLOOD PRESSURE: 153 MMHG | HEART RATE: 77 BPM | RESPIRATION RATE: 20 BRPM | BODY MASS INDEX: 38.84 KG/M2 | HEIGHT: 63 IN | TEMPERATURE: 97.9 F | WEIGHT: 219.2 LBS | OXYGEN SATURATION: 99 %

## 2024-09-11 DIAGNOSIS — Z79.4 TYPE 2 DIABETES MELLITUS WITHOUT COMPLICATION, WITH LONG-TERM CURRENT USE OF INSULIN (HCC): ICD-10-CM

## 2024-09-11 DIAGNOSIS — E55.9 VITAMIN D DEFICIENCY: ICD-10-CM

## 2024-09-11 DIAGNOSIS — Z76.89 ENCOUNTER TO ESTABLISH CARE: Primary | ICD-10-CM

## 2024-09-11 DIAGNOSIS — I10 ESSENTIAL (PRIMARY) HYPERTENSION: ICD-10-CM

## 2024-09-11 DIAGNOSIS — K59.04 CHRONIC IDIOPATHIC CONSTIPATION: ICD-10-CM

## 2024-09-11 DIAGNOSIS — E11.9 TYPE 2 DIABETES MELLITUS WITHOUT COMPLICATION, WITH LONG-TERM CURRENT USE OF INSULIN (HCC): ICD-10-CM

## 2024-09-11 PROCEDURE — 3077F SYST BP >= 140 MM HG: CPT | Performed by: STUDENT IN AN ORGANIZED HEALTH CARE EDUCATION/TRAINING PROGRAM

## 2024-09-11 PROCEDURE — 3079F DIAST BP 80-89 MM HG: CPT | Performed by: STUDENT IN AN ORGANIZED HEALTH CARE EDUCATION/TRAINING PROGRAM

## 2024-09-11 PROCEDURE — 99214 OFFICE O/P EST MOD 30 MIN: CPT | Performed by: STUDENT IN AN ORGANIZED HEALTH CARE EDUCATION/TRAINING PROGRAM

## 2024-09-11 PROCEDURE — 3051F HG A1C>EQUAL 7.0%<8.0%: CPT | Performed by: STUDENT IN AN ORGANIZED HEALTH CARE EDUCATION/TRAINING PROGRAM

## 2024-09-11 RX ORDER — VALSARTAN 80 MG/1
80 TABLET ORAL DAILY
Qty: 90 TABLET | Refills: 1 | Status: SHIPPED | OUTPATIENT
Start: 2024-09-11

## 2024-09-11 RX ORDER — DULAGLUTIDE 3 MG/.5ML
3 INJECTION, SOLUTION SUBCUTANEOUS WEEKLY
Qty: 2 ML | Refills: 2 | Status: SHIPPED | OUTPATIENT
Start: 2024-09-11

## 2024-09-11 RX ORDER — ERGOCALCIFEROL 1.25 MG/1
50000 CAPSULE, LIQUID FILLED ORAL WEEKLY
Qty: 12 CAPSULE | Refills: 1 | Status: SHIPPED | OUTPATIENT
Start: 2024-09-11

## 2024-09-11 SDOH — ECONOMIC STABILITY: INCOME INSECURITY: HOW HARD IS IT FOR YOU TO PAY FOR THE VERY BASICS LIKE FOOD, HOUSING, MEDICAL CARE, AND HEATING?: NOT HARD AT ALL

## 2024-09-11 SDOH — ECONOMIC STABILITY: FOOD INSECURITY: WITHIN THE PAST 12 MONTHS, THE FOOD YOU BOUGHT JUST DIDN'T LAST AND YOU DIDN'T HAVE MONEY TO GET MORE.: NEVER TRUE

## 2024-09-11 SDOH — ECONOMIC STABILITY: FOOD INSECURITY: WITHIN THE PAST 12 MONTHS, YOU WORRIED THAT YOUR FOOD WOULD RUN OUT BEFORE YOU GOT MONEY TO BUY MORE.: NEVER TRUE

## 2024-09-11 ASSESSMENT — PATIENT HEALTH QUESTIONNAIRE - PHQ9
SUM OF ALL RESPONSES TO PHQ QUESTIONS 1-9: 0
2. FEELING DOWN, DEPRESSED OR HOPELESS: NOT AT ALL
SUM OF ALL RESPONSES TO PHQ9 QUESTIONS 1 & 2: 0
1. LITTLE INTEREST OR PLEASURE IN DOING THINGS: NOT AT ALL
SUM OF ALL RESPONSES TO PHQ QUESTIONS 1-9: 0

## 2024-10-03 DIAGNOSIS — E78.00 HYPERCHOLESTEROLEMIA: ICD-10-CM

## 2024-10-03 RX ORDER — ATORVASTATIN CALCIUM 10 MG/1
10 TABLET, FILM COATED ORAL DAILY
Qty: 90 TABLET | Refills: 1 | Status: SHIPPED | OUTPATIENT
Start: 2024-10-03

## 2024-10-03 NOTE — TELEPHONE ENCOUNTER
Last appointment: 9/11/24  Next appointment: 12/10/24  Previous refill encounter(s): 4/8/24 #30 with 3 refills    Requested Prescriptions     Pending Prescriptions Disp Refills    atorvastatin (LIPITOR) 10 MG tablet [Pharmacy Med Name: ATORVASTATIN 10 MG TABLET] 90 tablet 1     Sig: TAKE 1 TABLET BY MOUTH EVERY DAY         For Pharmacy Admin Tracking Only    Program: Medication Refill  CPA in place:    Recommendation Provided To:   Intervention Detail: New Rx: 1, reason: Patient Preference  Intervention Accepted By:   Gap Closed?:    Time Spent (min): 5

## 2024-10-23 ENCOUNTER — TELEPHONE (OUTPATIENT)
Age: 54
End: 2024-10-23

## 2024-10-23 NOTE — TELEPHONE ENCOUNTER
----- Message from Marty SQUIRES sent at 10/23/2024  4:19 PM EDT -----  Regarding: ECC Escalation To Practice  ECC Escalation To Practice      Type of Escalation: Red Flag Symptom  --------------------------------------------------------------------------------------------------------------------------    Information for Provider:  Patient is looking for appointment for: Symptom tingling and numbness in the legs and feet  Reasons for Message: Unable to reach practice     Additional Information Unable to reach practice + Denied emergency care units  --------------------------------------------------------------------------------------------------------------------------    Relationship to Patient: Self     Call Back Info: OK to leave message on voicemail  Preferred Call Back Number: Phone  [phone]

## 2024-10-24 ENCOUNTER — TELEPHONE (OUTPATIENT)
Age: 54
End: 2024-10-24

## 2024-10-24 NOTE — TELEPHONE ENCOUNTER
[facility]  ECC Escalation To Practice      Type of Escalation: Red Flag Symptom  --------------------------------------------------------------------------------------------------------------------------    Information for Provider:  Patient is looking for appointment for: Symptom tingling and numbness in the legs and feet  Reasons for Message: Unable to reach practice    Additional Information Unable to reach practice + Denied emergency care units  --------------------------------------------------------------------------------------------------------------------------    Relationship to Patient: Self    Call Back Info: OK to leave message on voicemail  Preferred Call Back Number: Phone  [phone]

## 2024-12-10 ENCOUNTER — OFFICE VISIT (OUTPATIENT)
Age: 54
End: 2024-12-10
Payer: COMMERCIAL

## 2024-12-10 VITALS
HEIGHT: 63 IN | BODY MASS INDEX: 38.13 KG/M2 | DIASTOLIC BLOOD PRESSURE: 79 MMHG | SYSTOLIC BLOOD PRESSURE: 150 MMHG | TEMPERATURE: 98.1 F | OXYGEN SATURATION: 98 % | WEIGHT: 215.2 LBS | RESPIRATION RATE: 20 BRPM | HEART RATE: 79 BPM

## 2024-12-10 DIAGNOSIS — E11.9 TYPE 2 DIABETES MELLITUS WITHOUT COMPLICATION, WITHOUT LONG-TERM CURRENT USE OF INSULIN (HCC): Primary | ICD-10-CM

## 2024-12-10 DIAGNOSIS — I10 ESSENTIAL (PRIMARY) HYPERTENSION: ICD-10-CM

## 2024-12-10 LAB — HBA1C MFR BLD: 6.8 %

## 2024-12-10 PROCEDURE — 3077F SYST BP >= 140 MM HG: CPT | Performed by: STUDENT IN AN ORGANIZED HEALTH CARE EDUCATION/TRAINING PROGRAM

## 2024-12-10 PROCEDURE — 83036 HEMOGLOBIN GLYCOSYLATED A1C: CPT | Performed by: STUDENT IN AN ORGANIZED HEALTH CARE EDUCATION/TRAINING PROGRAM

## 2024-12-10 PROCEDURE — 99214 OFFICE O/P EST MOD 30 MIN: CPT | Performed by: STUDENT IN AN ORGANIZED HEALTH CARE EDUCATION/TRAINING PROGRAM

## 2024-12-10 PROCEDURE — 3051F HG A1C>EQUAL 7.0%<8.0%: CPT | Performed by: STUDENT IN AN ORGANIZED HEALTH CARE EDUCATION/TRAINING PROGRAM

## 2024-12-10 PROCEDURE — 3078F DIAST BP <80 MM HG: CPT | Performed by: STUDENT IN AN ORGANIZED HEALTH CARE EDUCATION/TRAINING PROGRAM

## 2024-12-10 RX ORDER — VALSARTAN 160 MG/1
160 TABLET ORAL DAILY
Qty: 30 TABLET | Refills: 0 | Status: SHIPPED
Start: 2024-12-10

## 2024-12-10 ASSESSMENT — PATIENT HEALTH QUESTIONNAIRE - PHQ9
1. LITTLE INTEREST OR PLEASURE IN DOING THINGS: NOT AT ALL
SUM OF ALL RESPONSES TO PHQ9 QUESTIONS 1 & 2: 0
SUM OF ALL RESPONSES TO PHQ QUESTIONS 1-9: 0
SUM OF ALL RESPONSES TO PHQ QUESTIONS 1-9: 0
2. FEELING DOWN, DEPRESSED OR HOPELESS: NOT AT ALL
SUM OF ALL RESPONSES TO PHQ QUESTIONS 1-9: 0
SUM OF ALL RESPONSES TO PHQ QUESTIONS 1-9: 0

## 2024-12-10 NOTE — PROGRESS NOTES
Chief Complaint   Patient presents with    Diabetes       \"Have you been to the ER, urgent care clinic since your last visit?  Hospitalized since your last visit?\"    NO    “Have you seen or consulted any other health care providers outside of [facility] since your last visit?”    NO    Have you had a mammogram?”   NO    Date of last Mammogram: 2022             Click Here for Release of Records Request     No results found for this visit on 12/10/24.   Vitals:    12/10/24 1550   BP: (!) 150/79   Site: Right Upper Arm   Position: Sitting   Cuff Size: Large Adult   Pulse: 79   Resp: 20   Temp: 98.1 °F (36.7 °C)   TempSrc: Temporal   SpO2: 98%   Weight: 97.6 kg (215 lb 3.2 oz)   Height: 1.588 m (5' 2.5\")      Health Maintenance Due   Topic Date Due    Diabetic retinal exam  Never done    Hepatitis B vaccine (2 of 3 - 19+ 3-dose series) 2015    Pneumococcal 0-64 years Vaccine (2 of 2 - PCV) 2016    Shingles vaccine (1 of 2) Never done    Diabetic Alb to Cr ratio (uACR) test  2023    Diabetic foot exam  2023    Flu vaccine (1) 2024    COVID-19 Vaccine (3 - - season) 2024    Breast cancer screen  2024    Cervical cancer screen  2024        The patient, Carolyn Donaldson, identity was verified by name and .

## 2024-12-10 NOTE — PROGRESS NOTES
[facility]  [address] [place] [place] [address] [phone]    Office Visit      Assessment and Plan     1. Type 2 diabetes mellitus without complication, without long-term current use of insulin (HCC)  Chronic, stable.  Congratulated patient on her recent improvements in her A1c.  Increase Trulicity to 4.5 mg weekly.  Discussed that if the constipation gets worse, she is to let me know and we can go back down to 3 mg.  Continue glipizide and metformin for now  - AMB POC HEMOGLOBIN A1C  - Dulaglutide 4.5 MG/0.5ML SOAJ; Inject 4.5 mg into the skin once a week  Dispense: 2 mL; Refill: 3    2. Essential (primary) hypertension  Chronic, uncontrolled.  Increase valsartan to 160 mg daily  - valsartan (DIOVAN) 160 MG tablet; Take 1 tablet by mouth daily  Dispense: 30 tablet; Refill: 0    3. BMI 39.0-39.9,adult  Chronic, improving.  Patient has had some weight loss since last visit.  Continue Trulicity.  Nutritional recommendations given. Provided nutritional and dietary counseling. Encouraged patient to drink water, avoid liquid calories. Encouraged patient to make healthy dietary changes, encouraged patient to increase fruits/veggies, avoidance of fast foods, fried foods.  Encouraged patient to follow low carbohydrate diet, avoid sweet tea, use Splenda or other artifical sweetener for beverages. Encouraged meal prepping. Drinking water 30 minutes prior to meal to help with hunger and portion control. Patient to find opportunities for exercise (aerobic videos at home, light weights, Alejandro, Wii sports/fitness). Discussed behavior modification. Discussed with patient to make small, achievable changes for better success with weight loss.           Return in about 3 months (around 3/10/2025) for Follow-up.         Discussed the expected course, resolution and complications of the diagnosis(es) in detail.  Medication risks, benefits, costs, interactions, and

## 2025-01-03 ENCOUNTER — TELEPHONE (OUTPATIENT)
Age: 55
End: 2025-01-03

## 2025-01-03 NOTE — TELEPHONE ENCOUNTER
Most likely is a form of carpal tunnel or nerve entrapement which typically gets worse at night. Would recommend applying voltaren gel to the arm and wrist up to 4 times per day, and considering getting a carpal tunnel brace to sleep in. Avoid compressing the arm by lying on it at night. If it does not get better, recommend follow-up visit. If unbearable over the weekend, could visit urgent care

## 2025-01-03 NOTE — TELEPHONE ENCOUNTER
Patient requesting  a call back to discuss thinking she has burning hand syndrome. Right hand burning that radiates up arm  this past week. Patient concerned because she is diabetic. Patient can be reached at [phone].

## 2025-01-03 NOTE — TELEPHONE ENCOUNTER
Returned call to pt, stated seen on 12/10/24 for diabetic follow up-  c/o right hand and arm burning sensation x 1 week,  only hurts at night,  denies injury , pain getting worse. Requesting advice

## 2025-01-19 DIAGNOSIS — E55.9 VITAMIN D DEFICIENCY: ICD-10-CM

## 2025-01-20 RX ORDER — ERGOCALCIFEROL 1.25 MG/1
50000 CAPSULE, LIQUID FILLED ORAL WEEKLY
Qty: 12 CAPSULE | Refills: 1 | Status: SHIPPED | OUTPATIENT
Start: 2025-01-20

## 2025-02-10 DIAGNOSIS — E11.65 POORLY CONTROLLED DIABETES MELLITUS (HCC): ICD-10-CM

## 2025-02-11 RX ORDER — GLIPIZIDE 10 MG/1
10 TABLET, FILM COATED, EXTENDED RELEASE ORAL DAILY
Qty: 90 TABLET | Refills: 1 | Status: SHIPPED | OUTPATIENT
Start: 2025-02-11

## 2025-02-11 NOTE — TELEPHONE ENCOUNTER
Last appointment: 12/10/24  Next appointment: 3/12/25  Previous refill encounter(s): 8/12/24 #90 with 1 refill    Requested Prescriptions     Pending Prescriptions Disp Refills    glipiZIDE (GLUCOTROL XL) 10 MG extended release tablet [Pharmacy Med Name: GLIPIZIDE ER 10 MG TABLET] 90 tablet 1     Sig: TAKE 1 TABLET BY MOUTH EVERY DAY         For Pharmacy Admin Tracking Only    Program: Medication Refill  CPA in place:    Recommendation Provided To:   Intervention Detail: New Rx: 1, reason: Patient Preference  Intervention Accepted By:   Gap Closed?:    Time Spent (min): 5

## 2025-03-02 ENCOUNTER — HOSPITAL ENCOUNTER (OUTPATIENT)
Facility: HOSPITAL | Age: 55
Discharge: HOME | End: 2025-03-05
Attending: ORTHOPAEDIC SURGERY
Payer: COMMERCIAL

## 2025-03-02 DIAGNOSIS — M67.431: ICD-10-CM

## 2025-03-02 PROCEDURE — A9579 GAD-BASE MR CONTRAST NOS,1ML: HCPCS | Performed by: ORTHOPAEDIC SURGERY

## 2025-03-02 PROCEDURE — 73223 MRI JOINT UPR EXTR W/O&W/DYE: CPT

## 2025-03-02 PROCEDURE — 6360000004 HC RX CONTRAST MEDICATION: Performed by: ORTHOPAEDIC SURGERY

## 2025-03-02 RX ADMIN — GADOTERIDOL 20 ML: 279.3 INJECTION, SOLUTION INTRAVENOUS at 14:11

## 2025-03-12 ENCOUNTER — OFFICE VISIT (OUTPATIENT)
Age: 55
End: 2025-03-12
Payer: COMMERCIAL

## 2025-03-12 VITALS
SYSTOLIC BLOOD PRESSURE: 125 MMHG | DIASTOLIC BLOOD PRESSURE: 72 MMHG | BODY MASS INDEX: 39.12 KG/M2 | TEMPERATURE: 98 F | WEIGHT: 220.8 LBS | HEART RATE: 77 BPM | HEIGHT: 63 IN | RESPIRATION RATE: 20 BRPM | OXYGEN SATURATION: 98 %

## 2025-03-12 DIAGNOSIS — E11.65 TYPE 2 DIABETES MELLITUS WITH HYPERGLYCEMIA, WITHOUT LONG-TERM CURRENT USE OF INSULIN (HCC): ICD-10-CM

## 2025-03-12 DIAGNOSIS — I10 ESSENTIAL (PRIMARY) HYPERTENSION: Primary | ICD-10-CM

## 2025-03-12 LAB — HBA1C MFR BLD: 7.1 %

## 2025-03-12 PROCEDURE — 3078F DIAST BP <80 MM HG: CPT | Performed by: STUDENT IN AN ORGANIZED HEALTH CARE EDUCATION/TRAINING PROGRAM

## 2025-03-12 PROCEDURE — 3051F HG A1C>EQUAL 7.0%<8.0%: CPT | Performed by: STUDENT IN AN ORGANIZED HEALTH CARE EDUCATION/TRAINING PROGRAM

## 2025-03-12 PROCEDURE — 3074F SYST BP LT 130 MM HG: CPT | Performed by: STUDENT IN AN ORGANIZED HEALTH CARE EDUCATION/TRAINING PROGRAM

## 2025-03-12 PROCEDURE — 83036 HEMOGLOBIN GLYCOSYLATED A1C: CPT | Performed by: STUDENT IN AN ORGANIZED HEALTH CARE EDUCATION/TRAINING PROGRAM

## 2025-03-12 PROCEDURE — 99214 OFFICE O/P EST MOD 30 MIN: CPT | Performed by: STUDENT IN AN ORGANIZED HEALTH CARE EDUCATION/TRAINING PROGRAM

## 2025-03-12 SDOH — ECONOMIC STABILITY: FOOD INSECURITY: WITHIN THE PAST 12 MONTHS, YOU WORRIED THAT YOUR FOOD WOULD RUN OUT BEFORE YOU GOT MONEY TO BUY MORE.: NEVER TRUE

## 2025-03-12 SDOH — ECONOMIC STABILITY: FOOD INSECURITY: WITHIN THE PAST 12 MONTHS, THE FOOD YOU BOUGHT JUST DIDN'T LAST AND YOU DIDN'T HAVE MONEY TO GET MORE.: NEVER TRUE

## 2025-03-12 ASSESSMENT — PATIENT HEALTH QUESTIONNAIRE - PHQ9
SUM OF ALL RESPONSES TO PHQ QUESTIONS 1-9: 0
SUM OF ALL RESPONSES TO PHQ QUESTIONS 1-9: 0
1. LITTLE INTEREST OR PLEASURE IN DOING THINGS: NOT AT ALL
SUM OF ALL RESPONSES TO PHQ QUESTIONS 1-9: 0
SUM OF ALL RESPONSES TO PHQ QUESTIONS 1-9: 0
2. FEELING DOWN, DEPRESSED OR HOPELESS: NOT AT ALL

## 2025-03-12 NOTE — PROGRESS NOTES
[facility]  [address] [place] [address] [phone]    Office Visit      Assessment and Plan       1. Essential (primary) hypertension  Chronic, controlled. Continue current regimen as described in the HPI and reconciled medication list      2. Type 2 diabetes mellitus with hyperglycemia, without long-term current use of insulin (HCC)  Chronic, stable.  Continue current regimen  - AMB POC HEMOGLOBIN A1C  - Albumin/Creatinine Ratio, Urine; Future      3. BMI 39.0-39.9,adult  Chronic, uncontrolled.  Discussed with patient that I recommend considering the medical weight loss program.  Patient states she will consider when she has slightly more free time in June and will let me know if she would like to go through with that.  Provided nutritional and dietary counseling. Encouraged patient to drink water, avoid liquid calories. Encouraged patient to make healthy dietary changes, encouraged patient to increase fruits/veggies, avoidance of fast foods, fried foods.  Encouraged patient to follow low carbohydrate diet, avoid sweet tea, use Splenda or other artifical sweetener for beverages. Encouraged meal prepping. Drinking water 30 minutes prior to meal to help with hunger and portion control. Patient to find opportunities for exercise (aerobic videos at home, light weights, Alejandro, Wii sports/fitness). Discussed behavior modification. Discussed with patient to make small, achievable changes for better success with weight loss.               Return in about 6 months (around 9/12/2025) for Follow-up.         Discussed the expected course, resolution and complications of the diagnosis(es) in detail.  Medication risks, benefits, costs, interactions, and alternatives were discussed as indicated.  Patient to contact the office if their condition worsens, changes or fails to improve. Pt verbalized understanding with the diagnosis(es) and plan.           Linda LYN

## 2025-03-12 NOTE — PROGRESS NOTES
Chief Complaint   Patient presents with    Diabetes     Follow up        \"Have you been to the ER, urgent care clinic since your last visit?  Hospitalized since your last visit?\"    NO    “Have you seen or consulted any other health care providers outside of [facility] since your last visit?”    NO    Have you had a mammogram?”   NO    Date of last Mammogram: 2022      “Have you had a pap smear?”    NO    No cervical cancer screening on file             Click Here for Release of Records Request     No results found for this visit on 25.   Vitals:    25 1615   BP: 125/72   Pulse: 77   Resp: 20   Temp: 98 °F (36.7 °C)   TempSrc: Temporal   SpO2: 98%   Weight: 100.2 kg (220 lb 12.8 oz)   Height: 1.588 m (5' 2.5\")      Health Maintenance Due   Topic Date Due    Diabetic retinal exam  Never done    Hepatitis B vaccine (2 of 3 - 19+ 3-dose series) 2015    Pneumococcal 50+ years Vaccine (2 of 2 - PCV) 2016    Diabetic Alb to Cr ratio (uACR) test  2023    Diabetic foot exam  2023    Breast cancer screen  2024    Cervical cancer screen  Never done        The patient, Carolyn Donaldson, identity was verified by name and .

## 2025-03-13 LAB
ALBUMIN/CREAT UR: <2 MG/G CREAT (ref 0–29)
CREAT UR-MCNC: 122.7 MG/DL
MICROALBUMIN UR-MCNC: <3 UG/ML

## 2025-03-14 ENCOUNTER — RESULTS FOLLOW-UP (OUTPATIENT)
Age: 55
End: 2025-03-14

## 2025-03-15 NOTE — RESULT ENCOUNTER NOTE
Your urine test for your diabetes looks good which means that there currently isn't a lot of stress on your kidneys from your diabetes. Keep working on making sure your blood sugars stay under control like we discussed in order to maintain your kidney health.

## 2025-04-04 DIAGNOSIS — E78.00 HYPERCHOLESTEROLEMIA: ICD-10-CM

## 2025-04-04 RX ORDER — ATORVASTATIN CALCIUM 10 MG/1
10 TABLET, FILM COATED ORAL DAILY
Qty: 90 TABLET | Refills: 1 | Status: SHIPPED | OUTPATIENT
Start: 2025-04-04

## 2025-04-04 NOTE — TELEPHONE ENCOUNTER
Last appointment: 3/12/25  Next appointment: 9/12/25  Previous refill encounter(s): 10/3/24 #90 with 1 refill    Requested Prescriptions     Pending Prescriptions Disp Refills    atorvastatin (LIPITOR) 10 MG tablet [Pharmacy Med Name: ATORVASTATIN 10 MG TABLET] 90 tablet 1     Sig: TAKE 1 TABLET BY MOUTH EVERY DAY         For Pharmacy Admin Tracking Only    Program: Medication Refill  CPA in place:    Recommendation Provided To:   Intervention Detail: New Rx: 1, reason: Patient Preference  Intervention Accepted By:   Gap Closed?:    Time Spent (min): 5

## 2025-06-16 DIAGNOSIS — Z79.4 TYPE 2 DIABETES MELLITUS WITHOUT COMPLICATION, WITH LONG-TERM CURRENT USE OF INSULIN (HCC): ICD-10-CM

## 2025-06-16 DIAGNOSIS — E11.9 TYPE 2 DIABETES MELLITUS WITHOUT COMPLICATION, WITH LONG-TERM CURRENT USE OF INSULIN (HCC): ICD-10-CM

## 2025-06-25 ENCOUNTER — TELEPHONE (OUTPATIENT)
Age: 55
End: 2025-06-25

## 2025-06-25 NOTE — TELEPHONE ENCOUNTER
Pt has a hangnail and it is hurting her finger really bad and she thinks that it may be starting to get infected. She can be reached at [phone].

## 2025-07-27 DIAGNOSIS — E11.65 POORLY CONTROLLED DIABETES MELLITUS (HCC): ICD-10-CM

## 2025-07-28 RX ORDER — GLIPIZIDE 10 MG/1
10 TABLET, FILM COATED, EXTENDED RELEASE ORAL DAILY
Qty: 90 TABLET | Refills: 1 | Status: SHIPPED | OUTPATIENT
Start: 2025-07-28

## 2025-07-28 NOTE — TELEPHONE ENCOUNTER
Last appointment: 3/12/25  Next appointment: 9/12/25  Previous refill encounter(s): 2/11/25 #90 with 1 refill    Requested Prescriptions     Pending Prescriptions Disp Refills    glipiZIDE (GLUCOTROL XL) 10 MG extended release tablet [Pharmacy Med Name: GLIPIZIDE ER 10 MG TABLET] 90 tablet 1     Sig: TAKE 1 TABLET BY MOUTH EVERY DAY         For Pharmacy Admin Tracking Only    Program: Medication Refill  CPA in place:    Recommendation Provided To:   Intervention Detail: New Rx: 1, reason: Patient Preference  Intervention Accepted By:   Gap Closed?:    Time Spent (min): 5